# Patient Record
Sex: FEMALE | Race: BLACK OR AFRICAN AMERICAN | Employment: UNEMPLOYED | URBAN - NONMETROPOLITAN AREA
[De-identification: names, ages, dates, MRNs, and addresses within clinical notes are randomized per-mention and may not be internally consistent; named-entity substitution may affect disease eponyms.]

---

## 2020-09-14 ENCOUNTER — TELEPHONE (OUTPATIENT)
Dept: FAMILY MEDICINE CLINIC | Age: 65
End: 2020-09-14

## 2020-09-14 NOTE — TELEPHONE ENCOUNTER
Attempted to call patient, went to daughters phone took message and will have patient call to schedule appointment

## 2024-05-27 ENCOUNTER — HOSPITAL ENCOUNTER (INPATIENT)
Age: 69
LOS: 4 days | Discharge: STILL ADMITTED OR ADMINISTRATIVE | End: 2024-05-31
Attending: EMERGENCY MEDICINE
Payer: MEDICARE

## 2024-05-27 ENCOUNTER — APPOINTMENT (OUTPATIENT)
Dept: CT IMAGING | Age: 69
End: 2024-05-27
Payer: MEDICARE

## 2024-05-27 ENCOUNTER — APPOINTMENT (OUTPATIENT)
Dept: MRI IMAGING | Age: 69
End: 2024-05-27
Payer: MEDICARE

## 2024-05-27 DIAGNOSIS — R53.1 ACUTE RIGHT-SIDED WEAKNESS: Primary | ICD-10-CM

## 2024-05-27 DIAGNOSIS — I63.9 ACUTE ISCHEMIC STROKE (HCC): ICD-10-CM

## 2024-05-27 LAB
ALBUMIN SERPL-MCNC: 4.5 G/DL (ref 3.5–5.2)
ALP SERPL-CCNC: 89 U/L (ref 35–104)
ALT SERPL-CCNC: 10 U/L (ref 0–32)
ANION GAP SERPL CALCULATED.3IONS-SCNC: 16 MMOL/L (ref 7–16)
AST SERPL-CCNC: 16 U/L (ref 0–31)
BASOPHILS # BLD: 0.03 K/UL (ref 0–0.2)
BASOPHILS NFR BLD: 0 % (ref 0–2)
BILIRUB SERPL-MCNC: 0.6 MG/DL (ref 0–1.2)
BUN SERPL-MCNC: 11 MG/DL (ref 6–23)
CALCIUM SERPL-MCNC: 9.9 MG/DL (ref 8.6–10.2)
CHLORIDE SERPL-SCNC: 100 MMOL/L (ref 98–107)
CO2 SERPL-SCNC: 21 MMOL/L (ref 22–29)
CREAT SERPL-MCNC: 0.9 MG/DL (ref 0.5–1)
EOSINOPHIL # BLD: 0.04 K/UL (ref 0.05–0.5)
EOSINOPHILS RELATIVE PERCENT: 1 % (ref 0–6)
ERYTHROCYTE [DISTWIDTH] IN BLOOD BY AUTOMATED COUNT: 14.6 % (ref 11.5–15)
GFR, ESTIMATED: 67 ML/MIN/1.73M2
GLUCOSE BLD-MCNC: 107 MG/DL (ref 74–99)
GLUCOSE SERPL-MCNC: 109 MG/DL (ref 74–99)
HCT VFR BLD AUTO: 46.6 % (ref 34–48)
HGB BLD-MCNC: 15 G/DL (ref 11.5–15.5)
IMM GRANULOCYTES # BLD AUTO: 0.04 K/UL (ref 0–0.58)
IMM GRANULOCYTES NFR BLD: 1 % (ref 0–5)
INR PPP: 1.1
LYMPHOCYTES NFR BLD: 1.1 K/UL (ref 1.5–4)
LYMPHOCYTES RELATIVE PERCENT: 13 % (ref 20–42)
MAGNESIUM SERPL-MCNC: 2 MG/DL (ref 1.6–2.6)
MCH RBC QN AUTO: 29 PG (ref 26–35)
MCHC RBC AUTO-ENTMCNC: 32.2 G/DL (ref 32–34.5)
MCV RBC AUTO: 90.1 FL (ref 80–99.9)
MONOCYTES NFR BLD: 0.62 K/UL (ref 0.1–0.95)
MONOCYTES NFR BLD: 8 % (ref 2–12)
NEUTROPHILS NFR BLD: 78 % (ref 43–80)
NEUTS SEG NFR BLD: 6.37 K/UL (ref 1.8–7.3)
PLATELET # BLD AUTO: 237 K/UL (ref 130–450)
PMV BLD AUTO: 10.3 FL (ref 7–12)
POTASSIUM SERPL-SCNC: 4.1 MMOL/L (ref 3.5–5)
PROT SERPL-MCNC: 7.5 G/DL (ref 6.4–8.3)
PROTHROMBIN TIME: 11.9 SEC (ref 9.3–12.4)
RBC # BLD AUTO: 5.17 M/UL (ref 3.5–5.5)
SODIUM SERPL-SCNC: 137 MMOL/L (ref 132–146)
WBC OTHER # BLD: 8.2 K/UL (ref 4.5–11.5)

## 2024-05-27 PROCEDURE — 85610 PROTHROMBIN TIME: CPT

## 2024-05-27 PROCEDURE — 6360000004 HC RX CONTRAST MEDICATION: Performed by: RADIOLOGY

## 2024-05-27 PROCEDURE — 83735 ASSAY OF MAGNESIUM: CPT

## 2024-05-27 PROCEDURE — 99222 1ST HOSP IP/OBS MODERATE 55: CPT | Performed by: STUDENT IN AN ORGANIZED HEALTH CARE EDUCATION/TRAINING PROGRAM

## 2024-05-27 PROCEDURE — 0042T CT BRAIN PERFUSION: CPT

## 2024-05-27 PROCEDURE — 70551 MRI BRAIN STEM W/O DYE: CPT

## 2024-05-27 PROCEDURE — 82962 GLUCOSE BLOOD TEST: CPT

## 2024-05-27 PROCEDURE — 6360000002 HC RX W HCPCS: Performed by: STUDENT IN AN ORGANIZED HEALTH CARE EDUCATION/TRAINING PROGRAM

## 2024-05-27 PROCEDURE — 99285 EMERGENCY DEPT VISIT HI MDM: CPT

## 2024-05-27 PROCEDURE — 2580000003 HC RX 258: Performed by: STUDENT IN AN ORGANIZED HEALTH CARE EDUCATION/TRAINING PROGRAM

## 2024-05-27 PROCEDURE — 70498 CT ANGIOGRAPHY NECK: CPT

## 2024-05-27 PROCEDURE — 85025 COMPLETE CBC W/AUTO DIFF WBC: CPT

## 2024-05-27 PROCEDURE — 70450 CT HEAD/BRAIN W/O DYE: CPT

## 2024-05-27 PROCEDURE — 4A03X5D MEASUREMENT OF ARTERIAL FLOW, INTRACRANIAL, EXTERNAL APPROACH: ICD-10-PCS | Performed by: INTERNAL MEDICINE

## 2024-05-27 PROCEDURE — 6370000000 HC RX 637 (ALT 250 FOR IP): Performed by: STUDENT IN AN ORGANIZED HEALTH CARE EDUCATION/TRAINING PROGRAM

## 2024-05-27 PROCEDURE — 2060000000 HC ICU INTERMEDIATE R&B

## 2024-05-27 PROCEDURE — 80053 COMPREHEN METABOLIC PANEL: CPT

## 2024-05-27 PROCEDURE — 99448 NTRPROF PH1/NTRNET/EHR 21-30: CPT | Performed by: STUDENT IN AN ORGANIZED HEALTH CARE EDUCATION/TRAINING PROGRAM

## 2024-05-27 PROCEDURE — 93005 ELECTROCARDIOGRAM TRACING: CPT | Performed by: EMERGENCY MEDICINE

## 2024-05-27 PROCEDURE — 70496 CT ANGIOGRAPHY HEAD: CPT

## 2024-05-27 PROCEDURE — 6370000000 HC RX 637 (ALT 250 FOR IP): Performed by: EMERGENCY MEDICINE

## 2024-05-27 RX ORDER — LANOLIN ALCOHOL/MO/W.PET/CERES
3 CREAM (GRAM) TOPICAL NIGHTLY PRN
Status: DISCONTINUED | OUTPATIENT
Start: 2024-05-27 | End: 2024-05-31 | Stop reason: HOSPADM

## 2024-05-27 RX ORDER — CALCIUM CARBONATE 500 MG/1
500 TABLET, CHEWABLE ORAL 3 TIMES DAILY PRN
Status: DISCONTINUED | OUTPATIENT
Start: 2024-05-27 | End: 2024-05-31 | Stop reason: HOSPADM

## 2024-05-27 RX ORDER — ONDANSETRON 4 MG/1
4 TABLET, ORALLY DISINTEGRATING ORAL EVERY 8 HOURS PRN
Status: DISCONTINUED | OUTPATIENT
Start: 2024-05-27 | End: 2024-05-31 | Stop reason: HOSPADM

## 2024-05-27 RX ORDER — LABETALOL HYDROCHLORIDE 5 MG/ML
10 INJECTION, SOLUTION INTRAVENOUS EVERY 6 HOURS PRN
Status: DISCONTINUED | OUTPATIENT
Start: 2024-05-27 | End: 2024-05-31 | Stop reason: HOSPADM

## 2024-05-27 RX ORDER — CLOPIDOGREL 300 MG/1
300 TABLET, FILM COATED ORAL ONCE
Status: COMPLETED | OUTPATIENT
Start: 2024-05-27 | End: 2024-05-27

## 2024-05-27 RX ORDER — NICOTINE 21 MG/24HR
1 PATCH, TRANSDERMAL 24 HOURS TRANSDERMAL DAILY
Status: DISCONTINUED | OUTPATIENT
Start: 2024-05-27 | End: 2024-05-31 | Stop reason: HOSPADM

## 2024-05-27 RX ORDER — CYCLOBENZAPRINE HCL 10 MG
10 TABLET ORAL ONCE
Status: COMPLETED | OUTPATIENT
Start: 2024-05-27 | End: 2024-05-27

## 2024-05-27 RX ORDER — HYDRALAZINE HYDROCHLORIDE 20 MG/ML
10 INJECTION INTRAMUSCULAR; INTRAVENOUS EVERY 6 HOURS PRN
Status: DISCONTINUED | OUTPATIENT
Start: 2024-05-27 | End: 2024-05-31 | Stop reason: HOSPADM

## 2024-05-27 RX ORDER — AMLODIPINE BESYLATE 5 MG/1
5 TABLET ORAL DAILY
Status: DISCONTINUED | OUTPATIENT
Start: 2024-05-28 | End: 2024-05-28

## 2024-05-27 RX ORDER — CLOPIDOGREL BISULFATE 75 MG/1
75 TABLET ORAL DAILY
Status: DISCONTINUED | OUTPATIENT
Start: 2024-05-28 | End: 2024-05-31 | Stop reason: HOSPADM

## 2024-05-27 RX ORDER — AMLODIPINE BESYLATE 5 MG/1
5 TABLET ORAL DAILY
Status: ON HOLD | COMMUNITY
End: 2024-05-27

## 2024-05-27 RX ORDER — POLYETHYLENE GLYCOL 3350 17 G/17G
17 POWDER, FOR SOLUTION ORAL DAILY PRN
Status: DISCONTINUED | OUTPATIENT
Start: 2024-05-27 | End: 2024-05-31 | Stop reason: HOSPADM

## 2024-05-27 RX ORDER — SODIUM CHLORIDE 0.9 % (FLUSH) 0.9 %
5-40 SYRINGE (ML) INJECTION PRN
Status: DISCONTINUED | OUTPATIENT
Start: 2024-05-27 | End: 2024-05-31 | Stop reason: HOSPADM

## 2024-05-27 RX ORDER — SODIUM CHLORIDE 0.9 % (FLUSH) 0.9 %
5-40 SYRINGE (ML) INJECTION EVERY 12 HOURS SCHEDULED
Status: DISCONTINUED | OUTPATIENT
Start: 2024-05-27 | End: 2024-05-31 | Stop reason: HOSPADM

## 2024-05-27 RX ORDER — LABETALOL HYDROCHLORIDE 5 MG/ML
10 INJECTION, SOLUTION INTRAVENOUS EVERY 6 HOURS PRN
Status: DISCONTINUED | OUTPATIENT
Start: 2024-05-27 | End: 2024-05-27

## 2024-05-27 RX ORDER — TRAMADOL HYDROCHLORIDE 50 MG/1
50 TABLET ORAL ONCE
Status: COMPLETED | OUTPATIENT
Start: 2024-05-27 | End: 2024-05-27

## 2024-05-27 RX ORDER — ACETAMINOPHEN 325 MG/1
650 TABLET ORAL EVERY 4 HOURS PRN
Status: DISCONTINUED | OUTPATIENT
Start: 2024-05-27 | End: 2024-05-31 | Stop reason: HOSPADM

## 2024-05-27 RX ORDER — ONDANSETRON 2 MG/ML
4 INJECTION INTRAMUSCULAR; INTRAVENOUS EVERY 6 HOURS PRN
Status: DISCONTINUED | OUTPATIENT
Start: 2024-05-27 | End: 2024-05-31 | Stop reason: HOSPADM

## 2024-05-27 RX ORDER — SODIUM CHLORIDE 9 MG/ML
INJECTION, SOLUTION INTRAVENOUS CONTINUOUS
Status: ACTIVE | OUTPATIENT
Start: 2024-05-27 | End: 2024-05-27

## 2024-05-27 RX ORDER — ATORVASTATIN CALCIUM 40 MG/1
80 TABLET, FILM COATED ORAL NIGHTLY
Status: DISCONTINUED | OUTPATIENT
Start: 2024-05-27 | End: 2024-05-31 | Stop reason: HOSPADM

## 2024-05-27 RX ORDER — SODIUM CHLORIDE 9 MG/ML
INJECTION, SOLUTION INTRAVENOUS PRN
Status: DISCONTINUED | OUTPATIENT
Start: 2024-05-27 | End: 2024-05-31 | Stop reason: HOSPADM

## 2024-05-27 RX ORDER — ENOXAPARIN SODIUM 100 MG/ML
40 INJECTION SUBCUTANEOUS DAILY
Status: DISCONTINUED | OUTPATIENT
Start: 2024-05-27 | End: 2024-05-31 | Stop reason: HOSPADM

## 2024-05-27 RX ADMIN — CYCLOBENZAPRINE 10 MG: 10 TABLET, FILM COATED ORAL at 18:26

## 2024-05-27 RX ADMIN — POLYETHYLENE GLYCOL 3350 17 G: 17 POWDER, FOR SOLUTION ORAL at 18:26

## 2024-05-27 RX ADMIN — ATORVASTATIN CALCIUM 80 MG: 40 TABLET, FILM COATED ORAL at 20:38

## 2024-05-27 RX ADMIN — TRAMADOL HYDROCHLORIDE 50 MG: 50 TABLET ORAL at 18:26

## 2024-05-27 RX ADMIN — ENOXAPARIN SODIUM 40 MG: 100 INJECTION SUBCUTANEOUS at 17:44

## 2024-05-27 RX ADMIN — CLOPIDOGREL BISULFATE 300 MG: 300 TABLET, FILM COATED ORAL at 09:47

## 2024-05-27 RX ADMIN — SODIUM CHLORIDE: 9 INJECTION, SOLUTION INTRAVENOUS at 17:44

## 2024-05-27 RX ADMIN — IOPAMIDOL 100 ML: 755 INJECTION, SOLUTION INTRAVENOUS at 09:39

## 2024-05-27 ASSESSMENT — PAIN DESCRIPTION - LOCATION
LOCATION: BACK
LOCATION: BACK

## 2024-05-27 ASSESSMENT — PAIN DESCRIPTION - DESCRIPTORS
DESCRIPTORS: ACHING;DISCOMFORT
DESCRIPTORS: ACHING;DISCOMFORT;SORE

## 2024-05-27 ASSESSMENT — PAIN - FUNCTIONAL ASSESSMENT
PAIN_FUNCTIONAL_ASSESSMENT: NONE - DENIES PAIN
PAIN_FUNCTIONAL_ASSESSMENT: NONE - DENIES PAIN

## 2024-05-27 ASSESSMENT — PAIN SCALES - GENERAL
PAINLEVEL_OUTOF10: 4
PAINLEVEL_OUTOF10: 8

## 2024-05-27 NOTE — ED NOTES
Pt to MRI, MRI called and aware of pt to go to room 8516B after MRI, pt has all belongings and chart

## 2024-05-27 NOTE — ED PROVIDER NOTES
HPI:  5/27/24,   Time: 9:18 AM EDT       Rivka Gamboa is a 69 y.o. female presenting to the ED for rt side weakness, beginning 19 hrs ago.  The complaint has been persistent, moderate in severity, and worsened by nothing.  Thinks having a reaction to blood pressure medicine.  Right-sided arm and leg weakness.  Some sensory changes.  Brought in by EMS.  Symptoms started at 2 PM yesterday.  No headache.  No nausea vomiting diarrhea.  Did fall several times.    Review of Systems:   Pertinent positives and negatives are stated within HPI, all other systems reviewed and are negative.          --------------------------------------------- PAST HISTORY ---------------------------------------------  Past Medical History:  has a past medical history of Hypertension and Pneumonia.    Past Surgical History:  has a past surgical history that includes Hysterectomy (2003); Tonsillectomy; and Colonoscopy (3/25/14).    Social History:  reports that she has been smoking cigarettes. She has a 20.0 pack-year smoking history. She has never used smokeless tobacco. She reports current alcohol use. She reports that she does not use drugs.    Family History: family history includes Asthma in her son; Cancer in her brother; Cancer (age of onset: 40) in her sister; Coronary Art Dis in her brother and brother; Hypertension in her brother, brother, brother, brother, and brother; Kidney Disease in her brother.     The patient’s home medications have been reviewed.    Allergies: Latex, Aspirin, and Pcn [penicillins]        ---------------------------------------------------PHYSICAL EXAM--------------------------------------    Constitutional/General: Alert and oriented x3, well appearing, non toxic in NAD  Head: Normocephalic and atraumatic  Eyes: PERRL, EOMI, conjunctive normal, sclera non icteric  Mouth: Oropharynx clear, handling secretions, no trismus, no asymmetry of the posterior oropharynx or uvular edema  Neck: Supple, full ROM,  have been reviewed by myself.  BP (!) 185/112   Pulse 75   Temp 98 °F (36.7 °C)   Resp 16   Ht 1.702 m (5' 7\")   Wt 83.5 kg (184 lb)   SpO2 99%   BMI 28.82 kg/m²   Oxygen Saturation Interpretation: Normal    The patient’s available past medical records and past encounters were reviewed.        ------------------------------ ED COURSE/MEDICAL DECISION MAKING----------------------  Medications   sodium chloride flush 0.9 % injection 5-40 mL ( IntraVENous Not Given 5/27/24 1148)   sodium chloride flush 0.9 % injection 5-40 mL (has no administration in time range)   0.9 % sodium chloride infusion (has no administration in time range)   ondansetron (ZOFRAN-ODT) disintegrating tablet 4 mg (has no administration in time range)     Or   ondansetron (ZOFRAN) injection 4 mg (has no administration in time range)   polyethylene glycol (GLYCOLAX) packet 17 g (has no administration in time range)   enoxaparin (LOVENOX) injection 40 mg (has no administration in time range)   atorvastatin (LIPITOR) tablet 80 mg (has no administration in time range)   clopidogrel (PLAVIX) tablet 75 mg (has no administration in time range)   labetalol (NORMODYNE;TRANDATE) injection 10 mg (has no administration in time range)   iopamidol (ISOVUE-370) 76 % injection 100 mL (100 mLs IntraVENous Given 5/27/24 0939)   clopidogrel (PLAVIX) tablet 300 mg (300 mg Oral Given 5/27/24 0947)         ED COURSE:             This patient's ED course included: a personal history and physicial examination    This patient has remained hemodynamically stable during their ED course.      Re-Evaluations:             Re-evaluation.  Patient’s symptoms show no change    Re-examination  5/27/24   9:18 AM EDT        Consultations:             Gujrati milanes    Critical Care: 33 min excluding billable procedures        Counseling:   The emergency provider has spoken with the patient and discussed today’s results, in addition to providing specific details for the plan

## 2024-05-27 NOTE — CONSULTS
I was contacted by the emergency department to review/discuss about this MEENAKSHI alert case.  The last known well time was reported as 2 PM on 5/26/2024.  I was informed of the relevant medical history and the presenting complaints and I myself did pertinent medical chart review.  I have personally reviewed the neuroimaging wherein [employer] software was utilized to assist with triage.  No face-to-face interaction with the patient was done.    Personal review of neuroimaging shows no acute intracranial abnormality on head CT.  There is no evidence of diffusion-perfusion mismatch on perfusion imaging.  There is significant venous contamination on vascular imaging but as best studied, there is no evidence of any intracranial large vessel occlusion.  There is some atherosclerotic disease in the neck in the anterior and posterior circulation but that is not hemodynamically significant.     Assessment/Plan:    Right-sided weakness along with right facial droop and this 69-year-old woman with vascular risk factors of hypertension, dyslipidemia and smoking.  Clinical suspicion for left basal ganglia/corona radiata lacunar infarct.    Loaded with dual antiplatelet agent.  Thereafter, full dose aspirin and Plavix 75 mg daily for next 4 weeks followed by baby aspirin lifelong.  Complete the stroke workup.  Please consult general neurology service for further management of this patient.  Please call me with any additional questions/concerns.    Total time spent reviewing the pertinent clinical presentation, review of neuroimaging, time spent in medical decision making and discussion of case with the physicians involved in the acute care was 28 mins.      Sang Kumari M.D.  Vascular Neurology & Neurointerventional Surgery

## 2024-05-27 NOTE — H&P
Hospitalist History & Physical      PCP: Berry Khanna MD    Date of Admission: 5/27/2024    Date of Service: Pt seen/examined on 5/27/2024 and is admitted to Inpatient with expected LOS greater than two midnights due to medical therapy.        Chief Complaint: right side weakness    History Of Present Illness: 69-year-old female patient with history of hypertension, nicotine dependence, hyperlipidemia who presents to the ER with complaint of right-sided upper and lower extremity weakness since yesterday around 2 PM.  Patient thought it was related to the initiation of amlodipine for hypertension at first, she had difficulty walking due to right leg weakness and fell at home few times, decided to come to ER for further evaluation.  Patient also endorsed left arm tingling sensation.  Patient denies headache, LOC, syncope, visual or auditory changes, trouble swallowing, chest pain, palpitations, recent illness, fever, chills.  She continues to smoke 10 cigarettes/day, has been smoking for many years and is not considering quitting yet.  She has no prior strokes or heart disease.  She developed swelling of her tongue after taking aspirin.   Upon ER evaluation stroke alert was called.  Her vital signs she was afebrile, /95 mmHg HR 78 RR 22 SpO2 97 room air.  CT head without contrast with no acute intracranial abnormality.  CT perfusion with no mismatch.  CTA head and neck with no LVO but fibrocalcific plaque causing 50% stenosis in the cavernous ICAs bilateral and 50% stenosis in the V2 segment of the right vertebral artery.   Telestroke was contacted and patient is not a candidate for TNK of endovascular procedure.  Recommended admission for further assessment and initiation of DAPT.  Patient is allergic to aspirin.  Patient was given loading dose Plavix 300 mg on the ER  [facility] hospitalist was called for admission.  Case was discussed with ER physician.  Family was present at bedside        Past  white matter ischemic disease that is worse. 3. No evidence of flow-limiting stenosis or dissection of the cervical carotid arteries. 4. Fibrocalcific plaque results in 50% stenoses in the cavernous ICAs bilaterally. 5. 50% stenosis in the V2 segment of the right vertebral artery.  Poor visualization of the left vertebral artery secondary to refluxed contrast into paraspinal collateral veins. 6. No areas of hypoperfusion.     CTA HEAD W CONTRAST    Result Date: 5/27/2024  1. No acute intracranial abnormality. 2. Volume loss with chronic microvascular white matter ischemic disease that is worse. 3. No evidence of flow-limiting stenosis or dissection of the cervical carotid arteries. 4. Fibrocalcific plaque results in 50% stenoses in the cavernous ICAs bilaterally. 5. 50% stenosis in the V2 segment of the right vertebral artery.  Poor visualization of the left vertebral artery secondary to refluxed contrast into paraspinal collateral veins. 6. No areas of hypoperfusion.     CTA NECK W CONTRAST    Result Date: 5/27/2024  1. No acute intracranial abnormality. 2. Volume loss with chronic microvascular white matter ischemic disease that is worse. 3. No evidence of flow-limiting stenosis or dissection of the cervical carotid arteries. 4. Fibrocalcific plaque results in 50% stenoses in the cavernous ICAs bilaterally. 5. 50% stenosis in the V2 segment of the right vertebral artery.  Poor visualization of the left vertebral artery secondary to refluxed contrast into paraspinal collateral veins. 6. No areas of hypoperfusion.     CT BRAIN PERFUSION    Result Date: 5/27/2024  1. No acute intracranial abnormality. 2. Volume loss with chronic microvascular white matter ischemic disease that is worse. 3. No evidence of flow-limiting stenosis or dissection of the cervical carotid arteries. 4. Fibrocalcific plaque results in 50% stenoses in the cavernous ICAs bilaterally. 5. 50% stenosis in the V2 segment of the right vertebral

## 2024-05-27 NOTE — PROGRESS NOTES
4 Eyes Skin Assessment     NAME:  Rivka Gamboa  YOB: 1955  MEDICAL RECORD NUMBER:  [id number]    The patient is being assessed for  Admission    I agree that at least one RN has performed a thorough Head to Toe Skin Assessment on the patient. ALL assessment sites listed below have been assessed.      Areas assessed by both nurses:    Head, Face, Ears, Shoulders, Back, Chest, Arms, Elbows, Hands, Sacrum. Buttock, Coccyx, Ischium, Legs. Feet and Heels, and Under Medical Devices         Does the Patient have a Wound? No noted wound(s)       Shivam Prevention initiated by RN: No  Wound Care Orders initiated by RN: No    Pressure Injury (Stage 3,4, Unstageable, DTI, NWPT, and Complex wounds) if present, place Wound referral order by RN under : No    New Ostomies, if present place, Ostomy referral order under : No     Nurse 1 eSignature: Electronically signed by Khloe Callejas RN on 5/27/24 at 6:33 PM EDT    **SHARE this note so that the co-signing nurse can place an eSignature**    Nurse 2 eSignature: Electronically signed by Liliya Harris RN on 5/27/24 at 6:35 PM EDT

## 2024-05-28 ENCOUNTER — APPOINTMENT (OUTPATIENT)
Age: 69
End: 2024-05-28
Attending: STUDENT IN AN ORGANIZED HEALTH CARE EDUCATION/TRAINING PROGRAM
Payer: MEDICARE

## 2024-05-28 PROBLEM — I63.522 ACUTE LEFT ACA ISCHEMIC STROKE (HCC): Status: ACTIVE | Noted: 2024-05-27

## 2024-05-28 LAB
CHOLEST SERPL-MCNC: 188 MG/DL
ECHO AO ASC DIAM: 2.4 CM
ECHO AO ASCENDING AORTA INDEX: 1.23 CM/M2
ECHO AV AREA PEAK VELOCITY: 1.8 CM2
ECHO AV AREA VTI: 1.8 CM2
ECHO AV AREA/BSA PEAK VELOCITY: 0.9 CM2/M2
ECHO AV AREA/BSA VTI: 0.9 CM2/M2
ECHO AV CUSP MM: 1.6 CM
ECHO AV MEAN GRADIENT: 4 MMHG
ECHO AV MEAN VELOCITY: 0.8 M/S
ECHO AV PEAK GRADIENT: 9 MMHG
ECHO AV PEAK VELOCITY: 1.5 M/S
ECHO AV VELOCITY RATIO: 0.67
ECHO AV VTI: 28.4 CM
ECHO LA DIAMETER INDEX: 1.54 CM/M2
ECHO LA DIAMETER: 3 CM
ECHO LA VOL A-L A2C: 26 ML (ref 22–52)
ECHO LA VOL A-L A4C: 22 ML (ref 22–52)
ECHO LA VOL MOD A2C: 24 ML (ref 22–52)
ECHO LA VOL MOD A4C: 20 ML (ref 22–52)
ECHO LA VOLUME AREA LENGTH: 25 ML
ECHO LA VOLUME INDEX A-L A2C: 13 ML/M2 (ref 16–34)
ECHO LA VOLUME INDEX A-L A4C: 11 ML/M2 (ref 16–34)
ECHO LA VOLUME INDEX AREA LENGTH: 13 ML/M2 (ref 16–34)
ECHO LA VOLUME INDEX MOD A2C: 12 ML/M2 (ref 16–34)
ECHO LA VOLUME INDEX MOD A4C: 10 ML/M2 (ref 16–34)
ECHO LV FRACTIONAL SHORTENING: 30 % (ref 28–44)
ECHO LV INTERNAL DIMENSION DIASTOLE INDEX: 2.26 CM/M2
ECHO LV INTERNAL DIMENSION DIASTOLIC: 4.4 CM (ref 3.9–5.3)
ECHO LV INTERNAL DIMENSION SYSTOLIC INDEX: 1.59 CM/M2
ECHO LV INTERNAL DIMENSION SYSTOLIC: 3.1 CM
ECHO LV ISOVOLUMETRIC RELAXATION TIME (IVRT): 92.3 MS
ECHO LV IVSD: 0.9 CM (ref 0.6–0.9)
ECHO LV IVSS: 1.1 CM
ECHO LV MASS 2D: 158.2 G (ref 67–162)
ECHO LV MASS INDEX 2D: 81.1 G/M2 (ref 43–95)
ECHO LV POSTERIOR WALL DIASTOLIC: 1.2 CM (ref 0.6–0.9)
ECHO LV POSTERIOR WALL SYSTOLIC: 2 CM
ECHO LV RELATIVE WALL THICKNESS RATIO: 0.55
ECHO LVOT AREA: 2.8 CM2
ECHO LVOT AV VTI INDEX: 0.65
ECHO LVOT DIAM: 1.9 CM
ECHO LVOT MEAN GRADIENT: 2 MMHG
ECHO LVOT PEAK GRADIENT: 4 MMHG
ECHO LVOT PEAK VELOCITY: 1 M/S
ECHO LVOT STROKE VOLUME INDEX: 27 ML/M2
ECHO LVOT SV: 52.7 ML
ECHO LVOT VTI: 18.6 CM
ECHO MV A VELOCITY: 1.06 M/S
ECHO MV AREA PHT: 3.6 CM2
ECHO MV AREA VTI: 2 CM2
ECHO MV E DECELERATION TIME (DT): 138.6 MS
ECHO MV E VELOCITY: 0.65 M/S
ECHO MV E/A RATIO: 0.61
ECHO MV LVOT VTI INDEX: 1.41
ECHO MV MAX VELOCITY: 1.1 M/S
ECHO MV MEAN GRADIENT: 1 MMHG
ECHO MV MEAN VELOCITY: 0.5 M/S
ECHO MV PEAK GRADIENT: 5 MMHG
ECHO MV PRESSURE HALF TIME (PHT): 61.7 MS
ECHO MV VTI: 26.3 CM
ECHO PV MAX VELOCITY: 1 M/S
ECHO PV MEAN GRADIENT: 2 MMHG
ECHO PV MEAN VELOCITY: 0.7 M/S
ECHO PV PEAK GRADIENT: 4 MMHG
ECHO PV VTI: 19.9 CM
ECHO PVEIN A DURATION: 115.3 MS
ECHO PVEIN A VELOCITY: 0.3 M/S
ECHO PVEIN PEAK D VELOCITY: 0.5 M/S
ECHO PVEIN PEAK S VELOCITY: 0.6 M/S
ECHO PVEIN S/D RATIO: 1.2
ECHO RV INTERNAL DIMENSION: 2.6 CM
ECHO RVOT MEAN GRADIENT: 2 MMHG
ECHO RVOT PEAK GRADIENT: 3 MMHG
ECHO RVOT PEAK VELOCITY: 0.9 M/S
ECHO RVOT VTI: 19.3 CM
EKG ATRIAL RATE: 69 BPM
EKG P AXIS: 53 DEGREES
EKG P-R INTERVAL: 178 MS
EKG Q-T INTERVAL: 412 MS
EKG QRS DURATION: 78 MS
EKG QTC CALCULATION (BAZETT): 441 MS
EKG R AXIS: 82 DEGREES
EKG T AXIS: 30 DEGREES
EKG VENTRICULAR RATE: 69 BPM
ERYTHROCYTE [DISTWIDTH] IN BLOOD BY AUTOMATED COUNT: 14.9 % (ref 11.5–15)
HBA1C MFR BLD: 6 % (ref 4–5.6)
HCT VFR BLD AUTO: 44.9 % (ref 34–48)
HDLC SERPL-MCNC: 51 MG/DL
HGB BLD-MCNC: 14.5 G/DL (ref 11.5–15.5)
LDLC SERPL CALC-MCNC: 119 MG/DL
MCH RBC QN AUTO: 29.7 PG (ref 26–35)
MCHC RBC AUTO-ENTMCNC: 32.3 G/DL (ref 32–34.5)
MCV RBC AUTO: 91.8 FL (ref 80–99.9)
PLATELET # BLD AUTO: 192 K/UL (ref 130–450)
PMV BLD AUTO: 10.6 FL (ref 7–12)
RBC # BLD AUTO: 4.89 M/UL (ref 3.5–5.5)
T4 FREE SERPL-MCNC: 1.2 NG/DL (ref 0.9–1.7)
TRIGL SERPL-MCNC: 91 MG/DL
TSH SERPL DL<=0.05 MIU/L-ACNC: 0.76 UIU/ML (ref 0.27–4.2)
VLDLC SERPL CALC-MCNC: 18 MG/DL
WBC OTHER # BLD: 6.7 K/UL (ref 4.5–11.5)

## 2024-05-28 PROCEDURE — 84443 ASSAY THYROID STIM HORMONE: CPT

## 2024-05-28 PROCEDURE — 2580000003 HC RX 258: Performed by: STUDENT IN AN ORGANIZED HEALTH CARE EDUCATION/TRAINING PROGRAM

## 2024-05-28 PROCEDURE — 99222 1ST HOSP IP/OBS MODERATE 55: CPT | Performed by: PHYSICIAN ASSISTANT

## 2024-05-28 PROCEDURE — 83036 HEMOGLOBIN GLYCOSYLATED A1C: CPT

## 2024-05-28 PROCEDURE — 99222 1ST HOSP IP/OBS MODERATE 55: CPT | Performed by: PSYCHIATRY & NEUROLOGY

## 2024-05-28 PROCEDURE — 93306 TTE W/DOPPLER COMPLETE: CPT | Performed by: INTERNAL MEDICINE

## 2024-05-28 PROCEDURE — 2060000000 HC ICU INTERMEDIATE R&B

## 2024-05-28 PROCEDURE — 36415 COLL VENOUS BLD VENIPUNCTURE: CPT

## 2024-05-28 PROCEDURE — 92523 SPEECH SOUND LANG COMPREHEN: CPT | Performed by: SPEECH-LANGUAGE PATHOLOGIST

## 2024-05-28 PROCEDURE — 99232 SBSQ HOSP IP/OBS MODERATE 35: CPT | Performed by: INTERNAL MEDICINE

## 2024-05-28 PROCEDURE — 93010 ELECTROCARDIOGRAM REPORT: CPT | Performed by: INTERNAL MEDICINE

## 2024-05-28 PROCEDURE — 97535 SELF CARE MNGMENT TRAINING: CPT

## 2024-05-28 PROCEDURE — 93306 TTE W/DOPPLER COMPLETE: CPT

## 2024-05-28 PROCEDURE — 6360000002 HC RX W HCPCS: Performed by: STUDENT IN AN ORGANIZED HEALTH CARE EDUCATION/TRAINING PROGRAM

## 2024-05-28 PROCEDURE — 97166 OT EVAL MOD COMPLEX 45 MIN: CPT

## 2024-05-28 PROCEDURE — 84439 ASSAY OF FREE THYROXINE: CPT

## 2024-05-28 PROCEDURE — 6370000000 HC RX 637 (ALT 250 FOR IP): Performed by: STUDENT IN AN ORGANIZED HEALTH CARE EDUCATION/TRAINING PROGRAM

## 2024-05-28 PROCEDURE — 80061 LIPID PANEL: CPT

## 2024-05-28 PROCEDURE — 85027 COMPLETE CBC AUTOMATED: CPT

## 2024-05-28 PROCEDURE — 92526 ORAL FUNCTION THERAPY: CPT | Performed by: SPEECH-LANGUAGE PATHOLOGIST

## 2024-05-28 PROCEDURE — 92610 EVALUATE SWALLOWING FUNCTION: CPT | Performed by: SPEECH-LANGUAGE PATHOLOGIST

## 2024-05-28 PROCEDURE — 6370000000 HC RX 637 (ALT 250 FOR IP): Performed by: INTERNAL MEDICINE

## 2024-05-28 RX ORDER — IBUPROFEN 400 MG/1
600 TABLET ORAL EVERY 6 HOURS PRN
Status: DISCONTINUED | OUTPATIENT
Start: 2024-05-28 | End: 2024-05-31 | Stop reason: HOSPADM

## 2024-05-28 RX ORDER — AMLODIPINE BESYLATE 10 MG/1
10 TABLET ORAL DAILY
Status: DISCONTINUED | OUTPATIENT
Start: 2024-05-28 | End: 2024-05-31 | Stop reason: HOSPADM

## 2024-05-28 RX ADMIN — SODIUM CHLORIDE, PRESERVATIVE FREE 10 ML: 5 INJECTION INTRAVENOUS at 20:13

## 2024-05-28 RX ADMIN — IBUPROFEN 600 MG: 400 TABLET, FILM COATED ORAL at 19:09

## 2024-05-28 RX ADMIN — CLOPIDOGREL BISULFATE 75 MG: 75 TABLET ORAL at 10:41

## 2024-05-28 RX ADMIN — ATORVASTATIN CALCIUM 80 MG: 40 TABLET, FILM COATED ORAL at 20:03

## 2024-05-28 RX ADMIN — ENOXAPARIN SODIUM 40 MG: 100 INJECTION SUBCUTANEOUS at 10:47

## 2024-05-28 RX ADMIN — POLYETHYLENE GLYCOL 3350 17 G: 17 POWDER, FOR SOLUTION ORAL at 10:41

## 2024-05-28 RX ADMIN — AMLODIPINE BESYLATE 10 MG: 10 TABLET ORAL at 10:41

## 2024-05-28 RX ADMIN — SODIUM CHLORIDE, PRESERVATIVE FREE 10 ML: 5 INJECTION INTRAVENOUS at 09:30

## 2024-05-28 ASSESSMENT — PAIN DESCRIPTION - ORIENTATION: ORIENTATION: RIGHT

## 2024-05-28 ASSESSMENT — PAIN DESCRIPTION - DESCRIPTORS: DESCRIPTORS: DULL

## 2024-05-28 ASSESSMENT — PAIN DESCRIPTION - LOCATION: LOCATION: LEG

## 2024-05-28 ASSESSMENT — PAIN SCALES - GENERAL: PAINLEVEL_OUTOF10: 6

## 2024-05-28 NOTE — PROGRESS NOTES
OCCUPATIONAL THERAPY INITIAL EVALUATION    [facility] [address], [place]       Date:2024                                                  Patient Name: Rivka Gamboa  MRN: [id number]  : 1955  Room: [address] [place] [address]    Evaluating OT: Reggie Pink OTR/L [id number]    Referring Provider: Milanes Marino, Maria, MD      Specific Provider Orders/Date: OT evaluation and treatment 24 1145    Diagnosis:  Acute ischemic stroke (HCC) [I63.9]  Acute right-sided weakness [R53.1]      Pertinent Medical History:  has a past medical history of Hypertension and Pneumonia.   Past Surgical History:   Procedure Laterality Date    COLONOSCOPY  3/25/14    HYSTERECTOMY (CERVIX STATUS UNKNOWN)      [facility]     TONSILLECTOMY         Pt admitted to the hospital  with R sided weakness     Orders received, chart reviewed, eval complete.     Precautions:  Fall Risk, R sided weakness, cognition, +alarms     Assessment of current deficits   [x] Functional mobility   [x]ADLs  [x] Strength               [x]Cognition   [x] Functional transfers   [x] IADLs         [x] Safety Awareness   [x]Endurance   [x] Fine Motor Coordination [x] Balance [] Vision/perception   [x]Sensation    [x] Gross Motor Coordination [x] ROM  [] Delirium                  [] Motor Control     OT PLAN OF CARE   OT POC based on physician orders, patient diagnosis and results of clinical assessment    Frequency/Duration 2-4 days/wk for 2 weeks PRN   Specific OT Treatment to include:   * Instruction/training on adapted ADL techniques and AE recommendations to increase functional independence within precautions       * Training on energy conservation strategies, correct breathing pattern and techniques to improve independence/tolerance for self-care routine  * Functional transfer/mobility training/DME recommendations for increased independence, safety, and fall prevention  *  demonstrated ability to locate and press call button.     Rehab Potential: Good  for established goals     Patient / Family Goal: to get better     Patient and/or family were instructed on functional diagnosis, prognosis/goals and OT plan of care. Pt/family demonstrated understanding.      Eval Complexity:      Description  Performance deficits  Clinical decision making  Co-morbidities affecting occupational performance  Modification or assistance to complete eval    Low Complexity   1 to 3 []  Low []  None []  None []   Moderate Complexity   3 to 5 [x]  Mod []  Maybe []  Min to Mod [x]   High Complexity   5 or more []  High [x]  Yes [x]  Max []     The above evaluation is classified as moderate  complexity based off the noted performance deficits, personal factors, co-morbidities, assistance required, and other factors as noted in the clinical evaluation and functional testing.     Time In: 1435  Time Out: 1458  Total Treatment Time: 8 minutes    Min Units   OT Eval Low 04981       OT Eval Moderate 97166  x 1   OT Eval High 76833       OT Re-Eval 09404       Therapeutic Ex 61101       Therapeutic Activities 45635      ADL/Self Care 52545  8 1   Orthotic Management 51232       Neuro Re-Ed 62187       Non-Billable Time          Evaluation Time includes thorough review of current medical information, gathering information on past medical history/social history and prior level of function, completion of standardized testing/informal observation of tasks, assessment of data and education on plan of care and goals.          Reggie Pink OTR/L [id number]

## 2024-05-28 NOTE — PROGRESS NOTES
swallowing  RN cleared patient for participation in assessment     yes     PRIOR LEVEL OF SWALLOW FUNCTION:    PAST HISTORY OF OROPHARYNGEAL DYSPHAGIA?: none reported    Home diet: Regular consistency solids (IDDSI level 7) with  thin liquids (IDDSI level 0)  Current Diet Order:  ADULT DIET; Regular; Low Sodium (2 gm)    PROCEDURE:  Consistencies Administered During the Evaluation   Liquids: thin liquid   Solids:  Hard solid ([employer] sandwich)     Method of Intake:   straw  Self fed      Position:   Lying in bed with head elevated below 75 degrees due to patient discomfort    CLINICAL ASSESSMENT:  Oral Stage:       prolonged mastication resulting in poor oral manipulation, poor bolus formation and increased oral residue post swallow  Piecemeal deglutition  Oral residuals were noted :  right buccal cavity         Pharyngeal Stage:    No signs of aspiration were noted during this evaluation however, silent aspiration cannot be ruled out at bedside.  If silent aspiration is suspected, a Videofluoroscopic Study of Swallowing (MBS) is recommended and requires a physician order.    Cognition:   Within functional limits for this exam    Oral Peripheral Examination   Right labiobuccal weakness    Current Respiratory Status    room air     Parameters of Speech Production  Respiration:  Adequate for speech production  Quality:   Within functional limits  Intensity: Within functional limits    Volitional Swallow: Present     Volitional Cough:  Present     Pain: No pain reported.    EDUCATION:   The Speech Language Pathologist (SLP) completed education regarding results of evaluation and that intervention is warranted at this time.  Learner: Patient and Family  Education: Reviewed results and recommendations of this evaluation, Reviewed diet and strategies, Reviewed signs, symptoms and risks of aspiration, Reviewed recommendations for follow-up, and Education Related to Potential Risks and Complications Due to  Impairment/Illness/Injury  Evaluation of Education:  Verbalizes understanding    This plan may be re-evaluated and revised as warranted.      Evaluation Time includes thorough review of current medical information, gathering information on past medical history/social history and prior level of function, completion of standardized testing/informal observation of tasks, assessment of data and education on plan of care and goals.    [x]The admitting diagnosis and active problem list, have been reviewed prior to initiation of this evaluation.        ACTIVE PROBLEM LIST:   Patient Active Problem List   Diagnosis    HTN (hypertension)    Acute ischemic stroke (HCC)    Acute right-sided weakness         CPT code:  85062  bedside swallow eval    INTERVENTION  CPT Code: 03898  dysphagia tx    Speech Pathologist (SLP) completed education with the patient/family regarding type of swallowing impairment. Reviewed current solid/liquid consistency diet recommendations and discussed compensatory strategies to ensure safe PO intake. Reviewed aspiration precautions and SLP's rationale for diet modifications. SLP also stressed that Pt should discuss recommendations with her physician. Cues provided to encourage clearance of bolus.  Encouraged patient and/or family to engage SLP in unstructured Q&A session relative to identified deficit areas; indicated understanding of all information provided via satisfactory verbal response.    Arabella Simmons M.S., CCC-SLP  Speech-Language Pathologist  [id number]  5/28/2024

## 2024-05-28 NOTE — PLAN OF CARE
Problem: Chronic Conditions and Co-morbidities  Goal: Patient's chronic conditions and co-morbidity symptoms are monitored and maintained or improved  5/28/2024 0522 by Ruby Patel RN  Outcome: Progressing  5/27/2024 1944 by Khloe Callejas RN  Outcome: Progressing     Problem: Discharge Planning  Goal: Discharge to home or other facility with appropriate resources  5/28/2024 0522 by Ruby Patel RN  Outcome: Progressing  5/27/2024 1944 by Khloe Callejas RN  Outcome: Progressing     Problem: Pain  Goal: Verbalizes/displays adequate comfort level or baseline comfort level  5/28/2024 0522 by Ruby Patel RN  Outcome: Progressing  5/27/2024 1944 by Khloe Callejas RN  Outcome: Progressing     Problem: Safety - Adult  Goal: Free from fall injury  5/28/2024 0522 by Ruby Patel RN  Outcome: Progressing  5/27/2024 1944 by Khloe Callejas RN  Outcome: Progressing

## 2024-05-28 NOTE — CARE COORDINATION
Chart reviewed and case reviewed in IDR.  Patient admitted with right sided weakness and droop.  MRI showing Acute infarct in the left posterior limb internal capsule. Echocardiogram compelted today, await reading.  Met with the patient at the bedside to discuss transition of care planning.  Patient working with OT.  Recommendation is for ARU.  Patient stated she was independent PTA living in a second story apartment with six steps to enter.  Patient has no DME, no history of HHC or rehab and does not drive.  Her kids take her where she needs to go.  Patient currently is going to [facility] for PCP but would like a new PCP appointment made at discharge for a [facility] Physician.  Patient gets her medications at [facility] on [address].  Discussed referral for rehab prior to returning home and patient is agreeable to ARU prior to returning home and would like to stay at [facility].  PM&R consult obtained and called to [id number].  Detailed VM left with referral information.  Await return call with physician to review case.  Will continue to follow for further transition of care planning needs.       Francie Cuellar RN.  P:  [phone]      Case Management Assessment  Initial Evaluation    Date/Time of Evaluation: 5/28/2024 3:09 PM  Assessment Completed by: Francie Cuellar RN    If patient is discharged prior to next notation, then this note serves as note for discharge by case management.    Patient Name: Rivka Gamboa                   YOB: 1955  Diagnosis: Acute ischemic stroke (HCC) [I63.9]  Acute right-sided weakness [R53.1]                   Date / Time: 5/27/2024  9:12 AM    Patient Admission Status: Inpatient   Readmission Risk (Low < 19, Mod (19-27), High > 27): Readmission Risk Score: 8.5    Current PCP: Berry Khanna MD  PCP verified by CM? Yes ([facility])    Chart Reviewed: Yes      History Provided by: Patient  Patient Orientation: Alert and Oriented    Patient Cognition:

## 2024-05-28 NOTE — PROGRESS NOTES
[facility] Hospitalist Progress Note    Admitting Date and Time: 5/27/2024  9:12 AM  Admit Dx: Acute ischemic stroke (HCC) [I63.9]  Acute right-sided weakness [R53.1]    Synopsis: 69-year-old female patient with history of hypertension, nicotine dependence, hyperlipidemia who presents to the ER with complaint of right-sided upper and lower extremity weakness since 5/26 no history of prior strokes or heart disease.  Patient with elevated blood pressure upon arrival. CT head without contrast with no acute intracranial abnormality. CT perfusion with no mismatch, CTA head and neck with no LVO but fibrocalcific plaque causing 50% stenosis in the cavernous ICAs bilateral and 50% stenosis in the V2 segment of the right vertebral artery. Telestroke was contacted and patient is not a candidate for TNK of endovascular procedure. Recommended admission for further assessment and initiation of DAPT. Patient is allergic to aspirin. Patient was given loading dose Plavix 300 mg on the ER     Subjective:  Patient seen and examined at bedside  Continues to have some issues with gripping objects such as her drink but otherwise strength is improving on the right side.    ROS: denies fever, chills, cp, sob, n/v, HA unless stated above.      amLODIPine  10 mg Oral Daily    sodium chloride flush  5-40 mL IntraVENous 2 times per day    enoxaparin  40 mg SubCUTAneous Daily    atorvastatin  80 mg Oral Nightly    clopidogrel  75 mg Oral Daily    nicotine  1 patch TransDERmal Daily     sodium chloride flush, 5-40 mL, PRN  sodium chloride, , PRN  ondansetron, 4 mg, Q8H PRN   Or  ondansetron, 4 mg, Q6H PRN  polyethylene glycol, 17 g, Daily PRN  labetalol, 10 mg, Q6H PRN  hydrALAZINE, 10 mg, Q6H PRN  melatonin, 3 mg, Nightly PRN  calcium carbonate, 500 mg, TID PRN  acetaminophen, 650 mg, Q4H PRN         Objective:    BP (!) 166/75   Pulse 65   Temp 98 °F (36.7 °C) (Oral)   Resp 18   Ht 1.702 m (5' 7\")   Wt 83.5 kg (184 lb)   SpO2  disease that   is worse.   3. No evidence of flow-limiting stenosis or dissection of the cervical   carotid arteries.   4. Fibrocalcific plaque results in 50% stenoses in the cavernous ICAs   bilaterally.   5. 50% stenosis in the V2 segment of the right vertebral artery.  Poor   visualization of the left vertebral artery secondary to refluxed contrast   into paraspinal collateral veins.   6. No areas of hypoperfusion.         CTA NECK W CONTRAST   Final Result   1. No acute intracranial abnormality.   2. Volume loss with chronic microvascular white matter ischemic disease that   is worse.   3. No evidence of flow-limiting stenosis or dissection of the cervical   carotid arteries.   4. Fibrocalcific plaque results in 50% stenoses in the cavernous ICAs   bilaterally.   5. 50% stenosis in the V2 segment of the right vertebral artery.  Poor   visualization of the left vertebral artery secondary to refluxed contrast   into paraspinal collateral veins.   6. No areas of hypoperfusion.         CT BRAIN PERFUSION   Final Result   1. No acute intracranial abnormality.   2. Volume loss with chronic microvascular white matter ischemic disease that   is worse.   3. No evidence of flow-limiting stenosis or dissection of the cervical   carotid arteries.   4. Fibrocalcific plaque results in 50% stenoses in the cavernous ICAs   bilaterally.   5. 50% stenosis in the V2 segment of the right vertebral artery.  Poor   visualization of the left vertebral artery secondary to refluxed contrast   into paraspinal collateral veins.   6. No areas of hypoperfusion.              Assessment:    Principal Problem:    Acute ischemic stroke (HCC)  Active Problems:    Acute right-sided weakness  Resolved Problems:    * No resolved hospital problems. *      Plan:  Acute ischemic stroke.  Late presentation.  Not a candidate for TNK .  No LVO, not a candidate for endovascular procedure.   MRI brain, TTE with bubble study for cardiac sources, continue

## 2024-05-28 NOTE — ACP (ADVANCE CARE PLANNING)
Advance Care Planning   Healthcare Decision Maker:    Primary Decision Maker: Su Gamboa - Child - [phone]    Primary Decision Maker: Lanny Palacios - Child - [phone]    Click here to complete Healthcare Decision Makers including selection of the Healthcare Decision Maker Relationship (ie \"Primary\").                 Francie Cuellar RN.

## 2024-05-28 NOTE — CONSULTS
[facility]  Neuro Inpatient Follow Up        Rivka Gamboa is a 69 y.o. right handed female     Neuro is following for ischemic stroke    Significant PMH: HTN, HLD, and nicotine dependence    HPI:    Rivka Gamboa is a 68 yo female who presented to ED for evaluation of right-sided weakness and dysarthria that began 19 hours prior to arrival in the ER.  She reports to abnormal sensation to the entirety of her right side, heaviness.  Upon arrival to ED NIH SS was 5, blood pressure was 197/95.  The patient denies any improvement of symptoms since admission.  She denies headache, slurred speech, difficulty swallowing, vision change, dizziness or lightheadedness.  The patient was not a candidate for TNK or endovascular intervention.    The patient denies previous history of stroke, she does not take an 81mg aspirin at home.     Subjective:  Patient is resting in bed, no new worries or concerns.  No improvement of symptoms since admission.  No family at bedside.     No chest pain or palpitations  No SOB  No vertigo, lightheadedness or loss of consciousness  No falls, tripping or stumbling  No incontinence of bowels or bladder  No itching or bruising appreciated  No swallowing troubles  + Right-sided arm and leg weakness, numbness, dysarthria  ROS otherwise negative     Objective:     BP (!) 166/75   Pulse 65   Temp 98 °F (36.7 °C) (Oral)   Resp 18   Ht 1.702 m (5' 7\")   Wt 83.5 kg (184 lb)   SpO2 93%   BMI 28.82 kg/m²     General appearance: alert, appears stated age, cooperative and no distress  Head: normocephalic, without obvious abnormality, atraumatic  Eyes: conjunctivae/corneas clear. .  Neck: full ROM without cervicalgia  Lungs: clear to auscultation bilaterally  Skin: color, texture, turgor normal---no rashes or lesions      Mental Status: Alert, oriented, thought content appropriate     Appropriate attention/concentration  Intact fundus of knowledge  Intact  memories    Speech: no dysarthria  Language: no aphasias    Cranial Nerves:  I: smell NA   II: visual acuity  NA   II: visual fields Full to confrontation   II: pupils ALEX   III,VII: ptosis None   III,IV,VI: extraocular muscles  Full ROM   V: mastication Normal   V: facial light touch sensation  Normal   V,VII: corneal reflex     VII: facial muscle function - upper  Normal   VII: facial muscle function - lower Right facial droop   VIII: hearing Normal   IX: soft palate elevation  Normal   IX,X: gag reflex    XI: trapezius strength  5/5   XI: sternocleidomastoid strength 5/5   XI: neck extension strength  5/5   XII: tongue strength  Right sided weakness     Motor:  Right   4/5              Left   5/5               Right Bicep  4/5           Left Bicep  5/5              Right Triceps   4/5       Left Triceps  5/5          Right Deltoid  5/5     Left Deltoid  5/5                  Right Quadriceps  5/5          Left Quadriceps    5/5           Right Gastrocnemius    5/5    Left Gastrocnemius   5/5  Right Ant Tibialis   5/5  Left Ant Tibialis   5/5    Sensory:  LT, PP and vibration intact      Coordination:   cerebellar arm drift present on the right  finger to nose abnormal on the right, normal heel to shin b/l  FELIPE    Gait:   Not tested safety concern    DTR:   Right Brachioradialis reflex 3+  Left Brachioradialis reflex 2+  Right Biceps reflex 3+  Left Biceps reflex 2+  Right Triceps reflex 3+  Left Triceps reflex 2+  Right Quadriceps reflex 2+  Left Quadriceps reflex 2+  Right Achilles reflex 1+  Left Achilles reflex 1+    No Babinskis  No Sauceda's    No pathological reflexes    Laboratory/Radiology:     CBC with Differential:    Lab Results   Component Value Date/Time    WBC 6.7 05/28/2024 05:56 AM    RBC 4.89 05/28/2024 05:56 AM    HGB 14.5 05/28/2024 05:56 AM    HCT 44.9 05/28/2024 05:56 AM     05/28/2024 05:56 AM    MCV 91.8 05/28/2024 05:56 AM    MCH 29.7 05/28/2024 05:56 AM    MCHC 32.3

## 2024-05-28 NOTE — PROGRESS NOTES
OT SESSION ATTEMPT     Date:2024  Patient Name: Rivka Gamboa  MRN: [id number]  : 1955  Room: [address] [place] [address]     Occupational therapy orders received/chart review completed and OT session attempted this date:   Pt getting ECHO at bedside at 1009      Will reattempt OT at a later time/date.  Thank you,   Reggie Pink OTR/L [id number]

## 2024-05-28 NOTE — PROGRESS NOTES
Date: 2024       Patient Name: Rivka Gamboa  : 1955      MRN: [id number]    PT order received. Chart has been reviewed. PT evaluation will be on hold due to patient getting ECHO bedside. Will continue to follow and complete evaluation at later time.     Yefri Malave, PT

## 2024-05-28 NOTE — PROGRESS NOTES
SPEECH/LANGUAGE PATHOLOGY  SPEECH/LANGUAGE/COGNITIVE EVALUATION   and PLAN OF CARE      PATIENT NAME:  Rivka Gamboa  (female)     MRN:  [id number]    :  1955  (69 y.o.)  STATUS:  Inpatient: Room 8516/8516-B    TODAY'S DATE:  24 1145    Speech Language Pathology (SLP) eval and treat  Start:  24 1145,   End:  24 1145,   ONE TIME,   Standing Count:  1 Occurrences,   R       Milanes Marino, Maria, MD  REASON FOR REFERRAL:  stroke-like symptoms   EVALUATING THERAPIST: LIZBET Thompson    ADMITTING DIAGNOSIS: Acute ischemic stroke (HCC) [I63.9]  Acute right-sided weakness [R53.1]    VISIT DIAGNOSIS:        SPEECH THERAPY  PLAN OF CARE   The speech therapy  POC is established based on physician order, speech pathology diagnosis and results of clinical assessment     SPEECH PATHOLOGY DIAGNOSIS:    Mild Cognitive Deficits, Minimal to Mild Dysarthria      Speech Pathology intervention is recommended up to 6 times per week for LOS or when goals are met with emphasis on the following:      Conditions Requiring Skilled Therapeutic Intervention for speech, language and/or cognition    Cognitive linguistic impairment    Specific Speech Therapy Interventions to Include:   Expressive language training   Therapeutic tasks for Cognition    Specific instructions for next treatment:     To initiate POC    SHORT/LONG TERM GOALS  Pt will improve problem solving/thought organization during structured and unstructured tasks with 90% accuracy   Pt will improve speech intelligibility and increased articulatory precision via compensatory strategies and oral motor exercises     Patient goals: Patient/family involved in developing goals and treatment plan:   Treatment goals discussed with Patient and Family    The Patient and Family understand(s) the diagnosis, prognosis and plan of care   The patient/family Agreed with above,     This plan may be re-evaluated and revised as warranted.         Rehabilitation Potential/Prognosis: good                CLINICAL ASSESSMENT:  MOTOR SPEECH       Oral Peripheral Examination   Right labiobuccal weakness    Parameters of Speech Production  Respiration:  Adequate for speech production  Articulation:  Minimal Distortion  Resonance:  Within functional limits  Quality:   Within functional limits  Pitch:    Within functional limits  Intensity: Within functional limits  Fluency:  Intact  Prosody Intact    Speech Intelligibility      Good given unstructured task and unknown context             RECEPTIVE LANGUAGE    Comprehension of Yes/No Questions:   Within functional limits    Process  Simple Verbal Commands:   Within functional limits  Process Intermediate Verbal Commands:   Could not test  Process Complex Verbal Commands:     Could not test    Comprehension of Conversation:      Within functional limits      EXPRESSIVE LANGUAGE     Serials: Functional    Imitation:  Words   Functional   Sentences To be assessed    Naming:  (Modality used:  Verbal)  Confrontation Naming  Functional  Functional Description  Functional  Response Naming: Functional    Conversation:      Decreased thought organization during structured conversation    COGNITION     Attention/Orientation  Attention: Sustained attention   Orientation:  Oriented to Person, Place, Date, Reason for hospitalization    Memory   Immediate Recall: Repeated 3/3    Delayed Recall:   Recalled 3/3    Long Term Recall:   Recalled Address, Birthdate, Age, and Family    Organization/Problem Solving/Reasoning   Verbal Sequencing:   To be assessed        Verbal Problem solving:   Impaired          CLINICAL OBSERVATIONS NOTED DURING THE EVALUATION  Flat affect and Reduced eye contact                  EDUCATION:   The Speech Language Pathologist (SLP) completed education regarding results of evaluation and that intervention is warranted at this time.  Learner: Patient  Education: Reviewed results and recommendations of

## 2024-05-29 PROCEDURE — 2580000003 HC RX 258: Performed by: STUDENT IN AN ORGANIZED HEALTH CARE EDUCATION/TRAINING PROGRAM

## 2024-05-29 PROCEDURE — 6370000000 HC RX 637 (ALT 250 FOR IP): Performed by: STUDENT IN AN ORGANIZED HEALTH CARE EDUCATION/TRAINING PROGRAM

## 2024-05-29 PROCEDURE — 97530 THERAPEUTIC ACTIVITIES: CPT

## 2024-05-29 PROCEDURE — 99232 SBSQ HOSP IP/OBS MODERATE 35: CPT | Performed by: NURSE PRACTITIONER

## 2024-05-29 PROCEDURE — 2060000000 HC ICU INTERMEDIATE R&B

## 2024-05-29 PROCEDURE — 97161 PT EVAL LOW COMPLEX 20 MIN: CPT

## 2024-05-29 PROCEDURE — 99232 SBSQ HOSP IP/OBS MODERATE 35: CPT | Performed by: INTERNAL MEDICINE

## 2024-05-29 PROCEDURE — 6370000000 HC RX 637 (ALT 250 FOR IP): Performed by: INTERNAL MEDICINE

## 2024-05-29 PROCEDURE — 6360000002 HC RX W HCPCS: Performed by: STUDENT IN AN ORGANIZED HEALTH CARE EDUCATION/TRAINING PROGRAM

## 2024-05-29 RX ORDER — HYDROCHLOROTHIAZIDE 25 MG/1
25 TABLET ORAL DAILY
Status: DISCONTINUED | OUTPATIENT
Start: 2024-05-29 | End: 2024-05-31 | Stop reason: HOSPADM

## 2024-05-29 RX ADMIN — ATORVASTATIN CALCIUM 80 MG: 40 TABLET, FILM COATED ORAL at 19:38

## 2024-05-29 RX ADMIN — MAGNESIUM HYDROXIDE 30 ML: 400 SUSPENSION ORAL at 00:20

## 2024-05-29 RX ADMIN — IBUPROFEN 600 MG: 400 TABLET, FILM COATED ORAL at 19:38

## 2024-05-29 RX ADMIN — ENOXAPARIN SODIUM 40 MG: 100 INJECTION SUBCUTANEOUS at 09:19

## 2024-05-29 RX ADMIN — SODIUM CHLORIDE, PRESERVATIVE FREE 10 ML: 5 INJECTION INTRAVENOUS at 19:38

## 2024-05-29 RX ADMIN — CLOPIDOGREL BISULFATE 75 MG: 75 TABLET ORAL at 09:22

## 2024-05-29 RX ADMIN — HYDROCHLOROTHIAZIDE 25 MG: 25 TABLET ORAL at 09:22

## 2024-05-29 RX ADMIN — AMLODIPINE BESYLATE 10 MG: 10 TABLET ORAL at 09:22

## 2024-05-29 RX ADMIN — SODIUM CHLORIDE, PRESERVATIVE FREE 10 ML: 5 INJECTION INTRAVENOUS at 09:20

## 2024-05-29 ASSESSMENT — PAIN SCALES - GENERAL
PAINLEVEL_OUTOF10: 0
PAINLEVEL_OUTOF10: 6

## 2024-05-29 ASSESSMENT — PAIN DESCRIPTION - LOCATION: LOCATION: FOOT

## 2024-05-29 ASSESSMENT — PAIN SCALES - WONG BAKER: WONGBAKER_NUMERICALRESPONSE: NO HURT

## 2024-05-29 ASSESSMENT — PAIN DESCRIPTION - ORIENTATION: ORIENTATION: RIGHT;LEFT

## 2024-05-29 NOTE — PROGRESS NOTES
function - upper  Normal   VII: facial muscle function - lower Right facial droop   VIII: hearing Normal   IX: soft palate elevation  Normal   IX,X: gag reflex    XI: trapezius strength  5/5   XI: sternocleidomastoid strength 5/5   XI: neck extension strength  5/5   XII: tongue strength  Right sided weakness     Motor:  Right   4/5              Left   5/5               Right Bicep  4/5           Left Bicep  5/5              Right Triceps   4/5       Left Triceps  5/5          Right Deltoid  5/5     Left Deltoid  5/5                  Right Quadriceps  5/5          Left Quadriceps    5/5           Right Gastrocnemius    5/5    Left Gastrocnemius   5/5  Right Ant Tibialis   5/5  Left Ant Tibialis   5/5    Sensory:  LT intact    Coordination:   Cerebellar arm drift present on the right  Finger to nose abnormal on the right, normal heel to shin b/l  FELIPE    Gait:  Not tested safety concern    DTR:   Right Brachioradialis reflex 3+  Left Brachioradialis reflex 2+  Right Biceps reflex 3+  Left Biceps reflex 2+  Right Triceps reflex 3+  Left Triceps reflex 2+  Right Quadriceps reflex 2+  Left Quadriceps reflex 2+  Right Achilles reflex 1+  Left Achilles reflex 1+    No Babinskis    Laboratory/Radiology:     CBC with Differential:    Lab Results   Component Value Date/Time    WBC 6.7 05/28/2024 05:56 AM    RBC 4.89 05/28/2024 05:56 AM    HGB 14.5 05/28/2024 05:56 AM    HCT 44.9 05/28/2024 05:56 AM     05/28/2024 05:56 AM    MCV 91.8 05/28/2024 05:56 AM    MCH 29.7 05/28/2024 05:56 AM    MCHC 32.3 05/28/2024 05:56 AM    RDW 14.9 05/28/2024 05:56 AM    LYMPHOPCT 13 05/27/2024 09:15 AM    MONOPCT 8 05/27/2024 09:15 AM    EOSPCT 1 05/27/2024 09:15 AM    BASOPCT 0 05/27/2024 09:15 AM    MONOSABS 0.62 05/27/2024 09:15 AM    LYMPHSABS 2.80 08/10/2013 05:10 PM    EOSABS 0.04 05/27/2024 09:15 AM    BASOSABS 0.03 05/27/2024 09:15 AM     CMP:    Lab Results   Component Value Date/Time     05/27/2024 09:15 AM    K  4.1 05/27/2024 09:15 AM     05/27/2024 09:15 AM    CO2 21 05/27/2024 09:15 AM    BUN 11 05/27/2024 09:15 AM    CREATININE 0.9 05/27/2024 09:15 AM    GFRAA >60 08/27/2014 12:00 PM    LABGLOM 67 05/27/2024 09:15 AM    GLUCOSE 109 05/27/2024 09:15 AM    CALCIUM 9.9 05/27/2024 09:15 AM    BILITOT 0.6 05/27/2024 09:15 AM    ALKPHOS 89 05/27/2024 09:15 AM    AST 16 05/27/2024 09:15 AM    ALT 10 05/27/2024 09:15 AM     HgBA1c:    Lab Results   Component Value Date/Time    LABA1C 6.0 05/28/2024 05:56 AM     FLP:    Lab Results   Component Value Date/Time    TRIG 91 05/28/2024 05:56 AM    HDL 51 05/28/2024 05:56 AM   LDL:119    CT head wo contrast, CTAs head and neck, brain perfusion:  1. No acute intracranial abnormality.  2. Volume loss with chronic microvascular white matter ischemic disease that  is worse.  3. No evidence of flow-limiting stenosis or dissection of the cervical  carotid arteries.  4. Fibrocalcific plaque results in 50% stenoses in the cavernous ICAs  bilaterally.  5. 50% stenosis in the V2 segment of the right vertebral artery.  Poor  visualization of the left vertebral artery secondary to refluxed contrast  into paraspinal collateral veins.  6. No areas of hypoperfusion    MRI brain wo contrast:  Acute infarct in the left posterior limb internal capsule.         All pertinent labs and images personally reviewed today    Assessment:     Acute left posterior internal capsule ischemic infarct:  --- Risk factors for stroke include hypertension, hyperlipidemia and nicotine dependence.    --- no improvement of symptoms since admission.      Will continue to reduce modifiable risk factors, discussed smoking cessation.    Plan:     Echo pending  Continue plavix, patient has allergy to aspirin   Lipitor 80mg on admission  PT, OT, ST  DVT prophylaxis   Neuro to follow      MARLY Mitchell - CNP,   2:10 PM  5/29/2024

## 2024-05-29 NOTE — PROGRESS NOTES
Acute Rehab Pre-Admission Screen      Referral date: 05/29/2024  Onset/Hospital Admit Date: 5/27/2024  9:12 AM    Current Location: 8516/8516-B    Name: Rivka Gamboa  YOB: 1955  Age: 69 y.o.  Admitting Diagnosis: cva   Address: [address] [place] [address]  Home Phone: [phone] (home)  Social Security #:     Sex: female  Race:B. Black or   Ethnicity: A. No, not of ,  or English origin    Marital Status: single     Ethnic/Cultural/Tenriism Considerations: Jehovah's witness                ADVANCED DIRECTIVES:     Full code   Copies are in the chart                 COVERAGE INFORMATION   Primary Insurer: [facility]  Phone: [phone]  FAX:[phone]  Secondary Insurer: [facility]  Medicare #: [id number]     Verified coverage with : Patient, Financial department, and Insurance carrier        MEDICAL UPDATE:  History of present admission: On 5/27/2024, patient was presented to the ED with complaints of right sided upper and lower extremity weakness. Patient thought it was related to the initiation of amlodipine for hypertension at first, she had difficulty walking due to right leg weakness and fell at home few times, decided to come to ER for further evaluation.  Patient also endorsed left arm tingling sensation. Upon arrival to ED NIH SS was 5, blood pressure was 197/95. MRI revealed an acute infarct in the left posterior limb internal capsule. The patient was not a candidate for TNK or endovascular intervention.       PHYSICIAN / REFERRAL INFORMATION  Referring Physician: Anthony Simon  Attending Physician: Wandy Blackmon *  Primary Care Physician: Berry Khanna MD  Consultants/Opinions (see full consult notes on chart): Neurology, Neuro IR    SOCIAL INFORMATION  Primary  Contact: Su Gamboa  Relationship: child  Primary Phone: [phone]    Secondary  Contact: Lanny Palacios  Relationship: Child  Primary Phone: [phone]   home.        RECOMMENDED LEVEL OF CARE:    Inpatient rehabilitation is recommended        Physician Assigned: Dr. Puet       PHYSICIAN ADMISSION DETERMINATION AND REVIEW:    ____________________________________________________________________  ____________________________________________________________________  ____________________________________________________________________  ____________________________________________________________________  ____________________________________________________________________      Physician Signature:_____________________________________    Print Signature:_________________________________________    Date:   5/31/2024 Time:    2:36 PM

## 2024-05-29 NOTE — DISCHARGE INSTR - COC
Continuity of Care Form    Patient Name: Rivka Gamboa   :  1955  MRN:  [id number]    Admit date:  2024  Discharge date:     Code Status Order: Full Code   Advance Directives:     Admitting Physician:  No admitting provider for patient encounter.  PCP: Berry Khanna MD    Discharging Nurse: alex  Discharging Hospital Unit/Room#: 8516/8516-B  Discharging Unit Phone Number: [phone]    Emergency Contact:   Extended Emergency Contact Information  Primary Emergency Contact: [place] [address] [facility]  Home Phone: [phone]  Relation: Child  Secondary Emergency [place]: [place] [address] [facility]  Home Phone: [phone]  Relation: Child    Past Surgical History:  Past Surgical History:   Procedure Laterality Date    COLONOSCOPY  3/25/14    HYSTERECTOMY (CERVIX STATUS UNKNOWN)      [facility]     TONSILLECTOMY         Immunization History:     There is no immunization history on file for this patient.    Active Problems:  Patient Active Problem List   Diagnosis Code    HTN (hypertension) I10    Acute left SARIAH ischemic stroke (HCC) I63.522    Acute right-sided weakness R53.1       Isolation/Infection:   Isolation            No Isolation          Patient Infection Status       None to display            Nurse Assessment:  Last Vital Signs: BP (!) 175/84   Pulse 97   Temp 97.8 °F (36.6 °C) (Temporal)   Resp 16   Ht 1.702 m (5' 7\")   Wt 83.5 kg (184 lb)   SpO2 93%   BMI 28.82 kg/m²     Last documented pain score (0-10 scale): Pain Level: 6  Last Weight:   Wt Readings from Last 1 Encounters:   24 83.5 kg (184 lb)     Mental Status:  oriented and alert    IV Access:  - None    Nursing Mobility/ADLs:  Walking   Assisted  Transfer  Assisted  Bathing  Assisted  Dressing  Assisted  Toileting  Assisted  Feeding  Assisted  Med Admin  Assisted  Med Delivery   whole    Wound Care Documentation and Therapy:

## 2024-05-29 NOTE — PROGRESS NOTES
[facility] Hospitalist Progress Note    Admitting Date and Time: 5/27/2024  9:12 AM  Admit Dx: Acute ischemic stroke (HCC) [I63.9]  Acute right-sided weakness [R53.1]    Synopsis: 69-year-old female patient with history of hypertension, nicotine dependence, hyperlipidemia who presents to the ER with complaint of right-sided upper and lower extremity weakness since 5/26 no history of prior strokes or heart disease.  Patient with elevated blood pressure upon arrival. CT head without contrast with no acute intracranial abnormality. CT perfusion with no mismatch, CTA head and neck with no LVO but fibrocalcific plaque causing 50% stenosis in the cavernous ICAs bilateral and 50% stenosis in the V2 segment of the right vertebral artery. Telestroke was contacted and patient is not a candidate for TNK of endovascular procedure. Recommended admission for further assessment and initiation of DAPT. Patient is allergic to aspirin. Patient was given loading dose Plavix 300 mg on the ER     Subjective:  Patient seen and examined at bedside  Continues to have some issues with gripping objects such as her drink but otherwise strength is improving on the right side.    ROS: denies fever, chills, cp, sob, n/v, HA unless stated above.      hydroCHLOROthiazide  25 mg Oral Daily    amLODIPine  10 mg Oral Daily    sodium chloride flush  5-40 mL IntraVENous 2 times per day    enoxaparin  40 mg SubCUTAneous Daily    atorvastatin  80 mg Oral Nightly    clopidogrel  75 mg Oral Daily    nicotine  1 patch TransDERmal Daily     magnesium hydroxide, 30 mL, Daily PRN  ibuprofen, 600 mg, Q6H PRN  sodium chloride flush, 5-40 mL, PRN  sodium chloride, , PRN  ondansetron, 4 mg, Q8H PRN   Or  ondansetron, 4 mg, Q6H PRN  polyethylene glycol, 17 g, Daily PRN  labetalol, 10 mg, Q6H PRN  hydrALAZINE, 10 mg, Q6H PRN  melatonin, 3 mg, Nightly PRN  calcium carbonate, 500 mg, TID PRN  acetaminophen, 650 mg, Q4H PRN         Objective:    BP (!) 175/84   Result   1. No acute intracranial abnormality.   2. Volume loss with chronic microvascular white matter ischemic disease that   is worse.   3. No evidence of flow-limiting stenosis or dissection of the cervical   carotid arteries.   4. Fibrocalcific plaque results in 50% stenoses in the cavernous ICAs   bilaterally.   5. 50% stenosis in the V2 segment of the right vertebral artery.  Poor   visualization of the left vertebral artery secondary to refluxed contrast   into paraspinal collateral veins.   6. No areas of hypoperfusion.         CTA NECK W CONTRAST   Final Result   1. No acute intracranial abnormality.   2. Volume loss with chronic microvascular white matter ischemic disease that   is worse.   3. No evidence of flow-limiting stenosis or dissection of the cervical   carotid arteries.   4. Fibrocalcific plaque results in 50% stenoses in the cavernous ICAs   bilaterally.   5. 50% stenosis in the V2 segment of the right vertebral artery.  Poor   visualization of the left vertebral artery secondary to refluxed contrast   into paraspinal collateral veins.   6. No areas of hypoperfusion.         CT BRAIN PERFUSION   Final Result   1. No acute intracranial abnormality.   2. Volume loss with chronic microvascular white matter ischemic disease that   is worse.   3. No evidence of flow-limiting stenosis or dissection of the cervical   carotid arteries.   4. Fibrocalcific plaque results in 50% stenoses in the cavernous ICAs   bilaterally.   5. 50% stenosis in the V2 segment of the right vertebral artery.  Poor   visualization of the left vertebral artery secondary to refluxed contrast   into paraspinal collateral veins.   6. No areas of hypoperfusion.              Assessment:    Principal Problem:    Acute left SARIAH ischemic stroke (HCC)  Active Problems:    Acute right-sided weakness  Resolved Problems:    * No resolved hospital problems. *      Plan:  Acute ischemic stroke.  Late presentation.  Not a candidate for TNK

## 2024-05-29 NOTE — CARE COORDINATION
Chart reviewed and case reviewed in IDR.  Work-up completed yesterday.  Echo showing EF of 65%, negative for PFO.  PM&R consult received yesterday, Dr Tao to see and review for appropriateness for admission today.  Spoke with Khloe, liaison with ARU.  She spoke with patient and patient is agreeable to program and wants to participate.  Once consult received from Dr Tao, they will initiate authorization with the patient's insurance company for transition of care.  CATINA completed.  Will Continue to follow for further transition of care planning needs.       Francie Cuellar RN.  P:  [phone]

## 2024-05-29 NOTE — CARE COORDINATION
Met with the patient at the bedside.  Patient is appropriate for ARU. Patient states she would like to admit to our ARU. Patient lives alone in a second floor apartment with no elevator access. Patient was independent PTA and ambulated with no device.  Patient states she has family that can help her at discharge.  Will begin precert for ARU today once updated PT note in entered.

## 2024-05-29 NOTE — PROGRESS NOTES
Initial Assessment     Name: Rivka Gamboa  : 1955  MRN: [id number]      Date of Service: 2024    Evaluating PT: Yefri Malave, PT       Room #:  8516/8516-B  Diagnosis:  Acute ischemic stroke (HCC) [I63.9]  Acute right-sided weakness [R53.1]  PMHx/PSHx:   has a past medical history of Hypertension and Pneumonia.  Procedure/Surgery:  none  Precautions:  Falls, FWB status, R weakness, bed alarm  Equipment Needs:  TBD    SUBJECTIVE:    Pt lives alone in a 2nd story apartment with 6 stair(s) and 1 rail(s) to enter. No elevator access. Pt ambulated with no assistive device prior to admission. Does not currently own any DME.    OBJECTIVE:   Initial Evaluation  Date: 24 Treatment Date: Short Term/ Long Term   Goals   AM-PAC 6 Clicks      Was pt agreeable to Eval/treatment? Yes     Does pt have pain? No     Bed Mobility  Rolling: Min  Supine to sit:   Min   Sit to supine: Min   Scooting: Min   Rolling: Ind  Supine to sit: Ind  Sit to supine: Ind  Scooting: Ind   Transfers Sit to stand: Mod x 2 to Wheeled Walker  Stand to sit: Mod   Stand pivot: Mod x 2 with Wheeled Walker  Sit to stand: Mod Ind  to AAD  Stand to sit: Ind   Stand pivot: Mod Ind  with AAD   Ambulation   20 feet with Wheeled Walker  and Mod x 2    20 feet with HHA and Mod x 2  75 feet with Min and AAD   Stair negotiation: ascended and descended NT  6 steps with Mod  1 rail    ROM BUE: Refer to OT note  BLE: WFL     Strength BUE: Refer to OT note  BLE: LLE: WFL        RLE: grossly 4-/5     Balance Sitting EOB: CGA  Dynamic Standing: Mod Ax2 with FWW  Sitting EOB: Ind  Dynamic Standing: Mod Ind with AAD     Pt is A & O x: 3 - patient alert to self, location, and month/year  Sensation: NT  Edema: None noted    Vitals:  WFL throughout session.    Therapeutic Exercises:  Functional activities completed as noted in grid including ambulation for 20 feet with mod x2 and FWW.    Patient education  Pt educated on role of PT, safety and POC,  importance of mobilization while in the hospital, and use of call light for safety.    Patient response to education:   Pt verbalized understanding Pt demonstrated skill Pt requires further education in this area   Yes Partial Yes     ASSESSMENT:    Conditions Requiring Skilled Therapeutic Intervention:    [x]Decreased strength     []Decreased ROM  [x]Decreased functional mobility  [x]Decreased balance   [x]Decreased endurance   [x]Decreased posture  []Decreased sensation  [x]Decreased coordination   []Decreased vision  [x]Decreased safety awareness   []Increased pain     Comments:    RN cleared this patient to participate in PT evaluation. Patient was agreeable to evaluation. Upon entering the room, patient was found supine in bed. Functional assessment findings and assist levels as noted above in grid. Patient transferred to sit EOB with verbal and tactile cues for sequencing. Sat EOB x 5 minutes in order to improve tolerance to upright and sitting balance. Sitting posture was upright with no LOB. Dynamic sitting balance was good with no LOB. Patient performed sit to stand transfer to FWW with verbal and tactile cues for hand placement and safety with AD use. The patient ambulated with a right lean and shuffled gait pattern. Decreased step length, speed, and R foot clearance noted. Ambulation had minimal change without use of an assistive device. Patient was returned to EOB and positioned supine in bed for comfort. Call light and patient belongings available and in reach. All needs met, all lines and tubes in tact.   This patient will benefit from continued PT possibly in a rehab setting in order to improve balance, strength, endurance, activity tolerance, postural awareness, and safety with functional mobility.    Treatment:  Patient practiced and was instructed in the following treatment:    Ambulation - verbal and tactile cues necessary for step length and foot clearance.  Transfers - Verbal and tactile cues

## 2024-05-30 PROCEDURE — 97530 THERAPEUTIC ACTIVITIES: CPT

## 2024-05-30 PROCEDURE — 2580000003 HC RX 258: Performed by: STUDENT IN AN ORGANIZED HEALTH CARE EDUCATION/TRAINING PROGRAM

## 2024-05-30 PROCEDURE — 2060000000 HC ICU INTERMEDIATE R&B

## 2024-05-30 PROCEDURE — 6370000000 HC RX 637 (ALT 250 FOR IP): Performed by: INTERNAL MEDICINE

## 2024-05-30 PROCEDURE — 99232 SBSQ HOSP IP/OBS MODERATE 35: CPT | Performed by: INTERNAL MEDICINE

## 2024-05-30 PROCEDURE — 6370000000 HC RX 637 (ALT 250 FOR IP): Performed by: STUDENT IN AN ORGANIZED HEALTH CARE EDUCATION/TRAINING PROGRAM

## 2024-05-30 PROCEDURE — 6360000002 HC RX W HCPCS: Performed by: STUDENT IN AN ORGANIZED HEALTH CARE EDUCATION/TRAINING PROGRAM

## 2024-05-30 PROCEDURE — 97535 SELF CARE MNGMENT TRAINING: CPT

## 2024-05-30 RX ORDER — GABAPENTIN 100 MG/1
100 CAPSULE ORAL 3 TIMES DAILY
Status: DISCONTINUED | OUTPATIENT
Start: 2024-05-30 | End: 2024-05-31 | Stop reason: HOSPADM

## 2024-05-30 RX ADMIN — GABAPENTIN 100 MG: 100 CAPSULE ORAL at 08:27

## 2024-05-30 RX ADMIN — AMLODIPINE BESYLATE 10 MG: 10 TABLET ORAL at 08:27

## 2024-05-30 RX ADMIN — ENOXAPARIN SODIUM 40 MG: 100 INJECTION SUBCUTANEOUS at 08:27

## 2024-05-30 RX ADMIN — IBUPROFEN 600 MG: 400 TABLET, FILM COATED ORAL at 20:03

## 2024-05-30 RX ADMIN — GABAPENTIN 100 MG: 100 CAPSULE ORAL at 20:03

## 2024-05-30 RX ADMIN — SODIUM CHLORIDE, PRESERVATIVE FREE 10 ML: 5 INJECTION INTRAVENOUS at 20:05

## 2024-05-30 RX ADMIN — HYDROCHLOROTHIAZIDE 25 MG: 25 TABLET ORAL at 08:27

## 2024-05-30 RX ADMIN — CLOPIDOGREL BISULFATE 75 MG: 75 TABLET ORAL at 08:27

## 2024-05-30 RX ADMIN — ATORVASTATIN CALCIUM 80 MG: 40 TABLET, FILM COATED ORAL at 20:04

## 2024-05-30 RX ADMIN — SODIUM CHLORIDE, PRESERVATIVE FREE 10 ML: 5 INJECTION INTRAVENOUS at 08:28

## 2024-05-30 RX ADMIN — GABAPENTIN 100 MG: 100 CAPSULE ORAL at 13:40

## 2024-05-30 ASSESSMENT — PAIN SCALES - GENERAL
PAINLEVEL_OUTOF10: 0
PAINLEVEL_OUTOF10: 6
PAINLEVEL_OUTOF10: 0

## 2024-05-30 ASSESSMENT — PAIN SCALES - WONG BAKER: WONGBAKER_NUMERICALRESPONSE: NO HURT

## 2024-05-30 NOTE — PROGRESS NOTES
Notified Dr Anthony phillip pt states she has \"burning\" in her feet and wants to know if theres something we can get for this

## 2024-05-30 NOTE — PROGRESS NOTES
Pt was advised of Echo results with no PFO.    Plan:  Continue Plavix  --- aspirin allergy  Continue statin  PT/OT/SLP  Stroke clinic follow up  Neurology to sign off

## 2024-05-30 NOTE — PROGRESS NOTES
PRN  polyethylene glycol, 17 g, Daily PRN  labetalol, 10 mg, Q6H PRN  hydrALAZINE, 10 mg, Q6H PRN  melatonin, 3 mg, Nightly PRN  calcium carbonate, 500 mg, TID PRN  acetaminophen, 650 mg, Q4H PRN         Objective:    /84   Pulse 81   Temp 97.6 °F (36.4 °C) (Temporal)   Resp 18   Ht 1.702 m (5' 7\")   Wt 83.5 kg (184 lb)   SpO2 96%   BMI 28.82 kg/m²     General Appearance: alert and oriented to person, place and time and in no acute distress  Skin: warm and dry  Head: normocephalic and atraumatic  Eyes: pupils equal, round, and reactive to light, extraocular eye movements intact, conjunctivae normal  Neck: neck supple and non tender without mass   Pulmonary/Chest: clear to auscultation bilaterally- no wheezes, rales or rhonchi, normal air movement, no respiratory distress  Cardiovascular: normal rate, normal S1 and S2 and no carotid bruits  Abdomen: soft, non-tender, non-distended, normal bowel sounds, no masses or organomegaly  Extremities: no cyanosis, no clubbing and no edema  Neurologic: no cranial nerve deficit and speech normal strength 4 out of 5 right upper extremity        No results for input(s): \"NA\", \"K\", \"CL\", \"CO2\", \"BUN\", \"CREATININE\", \"GLUCOSE\", \"CALCIUM\" in the last 72 hours.      Recent Labs     05/28/24  0556   WBC 6.7   RBC 4.89   HGB 14.5   HCT 44.9   MCV 91.8   MCH 29.7   MCHC 32.3   RDW 14.9      MPV 10.6         Radiology:   MRI brain without contrast   Final Result   Acute infarct in the left posterior limb internal capsule.         CT Head W/O Contrast   Final Result   1. No acute intracranial abnormality.   2. Volume loss with chronic microvascular white matter ischemic disease that   is worse.   3. No evidence of flow-limiting stenosis or dissection of the cervical   carotid arteries.   4. Fibrocalcific plaque results in 50% stenoses in the cavernous ICAs   bilaterally.   5. 50% stenosis in the V2 segment of the right vertebral artery.  Poor   visualization of the left

## 2024-05-30 NOTE — PLAN OF CARE
Problem: Chronic Conditions and Co-morbidities  Goal: Patient's chronic conditions and co-morbidity symptoms are monitored and maintained or improved  Outcome: Progressing  Flowsheets  Taken 5/29/2024 2112  Care Plan - Patient's Chronic Conditions and Co-Morbidity Symptoms are Monitored and Maintained or Improved:   Monitor and assess patient's chronic conditions and comorbid symptoms for stability, deterioration, or improvement   Collaborate with multidisciplinary team to address chronic and comorbid conditions and prevent exacerbation or deterioration   Update acute care plan with appropriate goals if chronic or comorbid symptoms are exacerbated and prevent overall improvement and discharge  Taken 5/29/2024 2108  Care Plan - Patient's Chronic Conditions and Co-Morbidity Symptoms are Monitored and Maintained or Improved:   Monitor and assess patient's chronic conditions and comorbid symptoms for stability, deterioration, or improvement   Collaborate with multidisciplinary team to address chronic and comorbid conditions and prevent exacerbation or deterioration   Update acute care plan with appropriate goals if chronic or comorbid symptoms are exacerbated and prevent overall improvement and discharge     Problem: Discharge Planning  Goal: Discharge to home or other facility with appropriate resources  Outcome: Progressing  Flowsheets (Taken 5/29/2024 2108)  Discharge to home or other facility with appropriate resources:   Identify barriers to discharge with patient and caregiver   Identify discharge learning needs (meds, wound care, etc)     Problem: Pain  Goal: Verbalizes/displays adequate comfort level or baseline comfort level  Outcome: Progressing  Flowsheets (Taken 5/29/2024 2112)  Verbalizes/displays adequate comfort level or baseline comfort level:   Assess pain using appropriate pain scale   Encourage patient to monitor pain and request assistance   Administer analgesics based on type and severity of pain  and evaluate response   Implement non-pharmacological measures as appropriate and evaluate response   Consider cultural and social influences on pain and pain management     Problem: Safety - Adult  Goal: Free from fall injury  Outcome: Progressing     Problem: Skin/Tissue Integrity  Goal: Absence of new skin breakdown  Description: 1.  Monitor for areas of redness and/or skin breakdown  2.  Assess vascular access sites hourly  3.  Every 4-6 hours minimum:  Change oxygen saturation probe site  4.  Every 4-6 hours:  If on nasal continuous positive airway pressure, respiratory therapy assess nares and determine need for appliance change or resting period.  Outcome: Progressing

## 2024-05-30 NOTE — PROGRESS NOTES
Notified Dr Anthony Simon pt states her right lower calf is experiencing \"sharp\" pain, no redness or swelling noted  Okay to order dvt us per Dr Sandor prado

## 2024-05-30 NOTE — CARE COORDINATION
Chart reviewed and case reviewed in IDR.  Work-up completed yesterday.  Echo showing EF of 65%, negative for PFO.  PM&R consult received yesterday, Dr Tao accepted and precert initiated yesterday for transition of care to ARU at [facility] yesterday.  Await authorization for transition of care to rehab.  CATINA completed.  Will Continue to follow for further transition of care planning needs.       Francie Cuellar RN.  P:  [phone]

## 2024-05-30 NOTE — PROGRESS NOTES
Occupational Therapy  OT BEDSIDE TREATMENT NOTE   [facility]  [address], [place]       Date:2024  Patient Name: Rivka Gamboa  MRN: [id number]  : 1955  Room: [facility]     Per OT Eval:    Evaluating OT: Reggie Pink OTR/L [id number]     Referring Provider: Milanes Marino, Maria, MD                                Specific Provider Orders/Date: OT evaluation and treatment 24 1145     Diagnosis:  Acute ischemic stroke (HCC) [I63.9]  Acute right-sided weakness [R53.1]       Pertinent Medical History:  has a past medical history of Hypertension and Pneumonia.   Past Surgical History         Past Surgical History:   Procedure Laterality Date    COLONOSCOPY   3/25/14    HYSTERECTOMY (CERVIX STATUS UNKNOWN)        [facility]     TONSILLECTOMY                Pt admitted to the hospital  with R sided weakness      Orders received, chart reviewed, eval complete.      Precautions:  Fall Risk, R sided weakness, cognition, +alarms      Assessment of current deficits   [x] Functional mobility             [x]ADLs           [x] Strength                  [x]Cognition   [x] Functional transfers           [x] IADLs         [x] Safety Awareness   [x]Endurance   [x] Fine Motor Coordination    [x] Balance      [] Vision/perception    [x]Sensation     [x] Gross Motor Coordination [x] ROM          [] Delirium                  [] Motor Control      OT PLAN OF CARE   OT POC based on physician orders, patient diagnosis and results of clinical assessment     Frequency/Duration 2-4 days/wk for 2 weeks PRN   Specific OT Treatment to include:   * Instruction/training on adapted ADL techniques and AE recommendations to increase functional independence within precautions       * Training on energy conservation strategies, correct breathing pattern and techniques to improve independence/tolerance for self-care routine  * Functional  commode   Mod A  Attempted to urinate with no results, pt required assist to manage clothing on R hip  Stand by assist    Bed Mobility  Rolling L/R: NT   Supine to sit: Min A    Sit to supine: Min A    CGA  Supine to sit  Supine to sit: Mod I   Sit to supine: Mod I    Functional Transfers Sit to stand: Min A    Stand to sit: Min A    Stand pivot: NT   Mod A  Sit < > Stand  Stand pivot with walker  Cues for hand placement & technique, R inattention, R LE weakness  Cues needed for walker management & to correct midline orientation, R lateral lean  Stand by assist    Functional Mobility Mod A  with no AD to the restroom and with FWW back from restroom   Mod A with walker  Able to walk in & out of bathroom with assist, cues was walker management, midline orientation, along with pt able to walk with PTA in hallway short distances, drifting to Right Stand by assist   with AAD    Balance Sitting: Stand by assist       Standing: Mod A    Sitting: SBA/CGA  Standing: Mod A with walker  Sitting: Mod I       Standing: Stand by assist    Activity Tolerance Fair+     Fair+ Good   Visual/  Perceptual wears glasses and for reading only      perception: WFL              BUE  ROM/Strength/  Fine motor Coordination Hand dominance: right      RUE: ROM WFL  however shoulder limited compared to LUE     Strength: grossly 3+/5      Strength: WFL      Coordination:  impaired     LUE: ROM WFL      Strength: grossly 4+/5      Strength: WFL      Coordination: WFL   instructed pt on R UE therapeutic exercises due to decreased strength & coordination in all planes with Fair+ understanding. Pt will participate in BUE exercise with supervision to increase strength, ROM, and coordination for increased independence in functional mobility and ADLs    Safety   Fair  Fair   Mildly impulsive  Good during functional activity      Education:  Pt was educated through out treatment regarding proper technique & safety with bed mobility, functional

## 2024-05-30 NOTE — PROGRESS NOTES
Treatment Note  Name: Rivka Gamboa  : 1955  MRN: [id number]      Date of Service: 2024    Evaluating PT: Yefri Malave PT       Room #:  8516/8516-B  Diagnosis:  Acute ischemic stroke (HCC) [I63.9]  Acute right-sided weakness [R53.1]  PMHx/PSHx:   has a past medical history of Hypertension and Pneumonia.  Procedure/Surgery:  none  Precautions:  Falls, FWB status, R weakness, bed alarm  Equipment Needs:  TBD    SUBJECTIVE:    Pt lives alone in a 2nd story apartment with 6 stair(s) and 1 rail(s) to enter. No elevator access. Pt ambulated with no assistive device prior to admission. Does not currently own any DME.    OBJECTIVE:   Initial Evaluation  Date: 24 Treatment Date:  24 Short Term/ Long Term   Goals   AM-PAC 6 Clicks     Was pt agreeable to Eval/treatment? Yes Yes     Does pt have pain? No None     Bed Mobility  Rolling: Min  Supine to sit:   Min   Sit to supine: Min   Scooting: Min  Rolling SBA  Supine to sit SBA  Scooting SBA Rolling: Ind  Supine to sit: Ind  Sit to supine: Ind  Scooting: Ind   Transfers Sit to stand: Mod x 2 to Wheeled Walker  Stand to sit: Mod   Stand pivot: Mod x 2 with Wheeled Walker Sit to stand mod A   Stand to sit mod A  Stand pivot with ww with mod A  Sit to stand: Mod Ind  to AAD  Stand to sit: Ind   Stand pivot: Mod Ind  with AAD   Ambulation   20 feet with Wheeled Walker  and Mod x 2    20 feet with HHA and Mod x 2 40 feet x2 with ww with mod A    75 feet with Min and AAD   Stair negotiation: ascended and descended NT Nt  6 steps with Mod  1 rail    ROM BUE: Refer to OT note  BLE: WFL     Strength BUE: Refer to OT note  BLE: LLE: WFL        RLE: grossly 4-/5     Balance Sitting EOB: CGA  Dynamic Standing: Mod Ax2 with FWW  Sitting EOB: Ind  Dynamic Standing: Mod Ind with AAD         Therapeutic Exercises:  Ankle  pumps ( R LE x5)   LAQS ( R LE x5)   Marching ( R LE x5 )     Patient education  Pt educated on increased R side awareness     Patient  treat  Start:  05/27/24 1145,   End:  05/27/24 1145,   ONE TIME,   Standing Count:  1 Occurrences,   R        Order went unreviewed at transfer on Mon May 27, 2024  3:04 PM    Milanes Marino, Maria, MD     Diagnosis:  Acute ischemic stroke (HCC) [I63.9]  Acute right-sided weakness [R53.1]  Specific instructions for next treatment:  Continue to progress ambulation with appropriate assistive device as able.    Current Treatment Recommendations:     [x] Strengthening to improve independence with functional mobility   [] ROM to improve independence with functional mobility   [x] Balance Training to improve static/dynamic balance and to reduce fall risk  [x] Endurance Training to improve activity tolerance during functional mobility   [x] Transfer Training to improve safety and independence with all functional transfers   [x] Gait Training to improve gait mechanics, endurance and assess need for appropriate assistive device  [x] Stair Training in preparation for safe discharge home and/or into the community   [] Positioning to prevent skin breakdown and contractures  [x] Safety and Education Training   [] Patient/Caregiver Education   [] HEP  [] Other     PT long term treatment goals are located in above grid    Frequency of treatments: 2-5x/week x 1-2 weeks.    Time in 0915  Time out  0945    Total Treatment Time  30 minutes         CPT codes:  [] Low Complexity PT evaluation 49171  [] Moderate Complexity PT evaluation 49940  [] High Complexity PT evaluation 37474  [] PT Re-evaluation 19325  [] Gait training 50633 minutes  [] Manual therapy 86753 minutes  [x] Therapeutic activities 48398  30 minutes  [] Therapeutic exercises 95794 minutes  [] Neuromuscular reeducation 81284 minutes     Beth Lombardo [id number]

## 2024-05-30 NOTE — PROGRESS NOTES
Pt c/o HA at time of shift change. Pt requested \"ibuprofen.\" Prn ibuprofen taken to patient. Pt then c/o bilateral foot \"burning.\" Pt reported it \"just started this evening.\" MD notified on [employer]serve.   Kaila Fontanez RN

## 2024-05-31 ENCOUNTER — HOSPITAL ENCOUNTER (INPATIENT)
Age: 69
LOS: 12 days | Discharge: HOME HEALTH | DRG: 057 | End: 2024-06-12
Attending: PHYSICAL MEDICINE & REHABILITATION | Admitting: PHYSICAL MEDICINE & REHABILITATION
Payer: MEDICARE

## 2024-05-31 ENCOUNTER — APPOINTMENT (OUTPATIENT)
Dept: ULTRASOUND IMAGING | Age: 69
End: 2024-05-31
Payer: MEDICARE

## 2024-05-31 VITALS
BODY MASS INDEX: 28.88 KG/M2 | TEMPERATURE: 98.5 F | SYSTOLIC BLOOD PRESSURE: 141 MMHG | OXYGEN SATURATION: 98 % | DIASTOLIC BLOOD PRESSURE: 84 MMHG | RESPIRATION RATE: 18 BRPM | HEART RATE: 81 BPM | HEIGHT: 67 IN | WEIGHT: 184 LBS

## 2024-05-31 PROBLEM — I63.9 ACUTE CEREBROVASCULAR ACCIDENT (CVA) (HCC): Status: ACTIVE | Noted: 2024-05-31

## 2024-05-31 PROCEDURE — 1280000000 HC REHAB R&B

## 2024-05-31 PROCEDURE — 99239 HOSP IP/OBS DSCHRG MGMT >30: CPT | Performed by: INTERNAL MEDICINE

## 2024-05-31 PROCEDURE — 2580000003 HC RX 258: Performed by: STUDENT IN AN ORGANIZED HEALTH CARE EDUCATION/TRAINING PROGRAM

## 2024-05-31 PROCEDURE — 6360000002 HC RX W HCPCS: Performed by: STUDENT IN AN ORGANIZED HEALTH CARE EDUCATION/TRAINING PROGRAM

## 2024-05-31 PROCEDURE — 97535 SELF CARE MNGMENT TRAINING: CPT

## 2024-05-31 PROCEDURE — 6370000000 HC RX 637 (ALT 250 FOR IP): Performed by: INTERNAL MEDICINE

## 2024-05-31 PROCEDURE — 97530 THERAPEUTIC ACTIVITIES: CPT

## 2024-05-31 PROCEDURE — 93971 EXTREMITY STUDY: CPT

## 2024-05-31 PROCEDURE — 6370000000 HC RX 637 (ALT 250 FOR IP): Performed by: STUDENT IN AN ORGANIZED HEALTH CARE EDUCATION/TRAINING PROGRAM

## 2024-05-31 RX ORDER — LANOLIN ALCOHOL/MO/W.PET/CERES
3 CREAM (GRAM) TOPICAL NIGHTLY PRN
Status: CANCELLED | OUTPATIENT
Start: 2024-05-31

## 2024-05-31 RX ORDER — HYDRALAZINE HYDROCHLORIDE 20 MG/ML
10 INJECTION INTRAMUSCULAR; INTRAVENOUS EVERY 6 HOURS PRN
Status: CANCELLED | OUTPATIENT
Start: 2024-05-31

## 2024-05-31 RX ORDER — ATORVASTATIN CALCIUM 40 MG/1
80 TABLET, FILM COATED ORAL NIGHTLY
Status: CANCELLED | OUTPATIENT
Start: 2024-05-31

## 2024-05-31 RX ORDER — POLYETHYLENE GLYCOL 3350 17 G/17G
17 POWDER, FOR SOLUTION ORAL DAILY PRN
Status: DISCONTINUED | OUTPATIENT
Start: 2024-05-31 | End: 2024-06-12 | Stop reason: HOSPADM

## 2024-05-31 RX ORDER — ONDANSETRON 4 MG/1
4 TABLET, ORALLY DISINTEGRATING ORAL EVERY 8 HOURS PRN
Status: DISCONTINUED | OUTPATIENT
Start: 2024-05-31 | End: 2024-06-12 | Stop reason: HOSPADM

## 2024-05-31 RX ORDER — SODIUM CHLORIDE 9 MG/ML
INJECTION, SOLUTION INTRAVENOUS PRN
Status: CANCELLED | OUTPATIENT
Start: 2024-05-31

## 2024-05-31 RX ORDER — CALCIUM CARBONATE 500 MG/1
500 TABLET, CHEWABLE ORAL 3 TIMES DAILY PRN
Status: DISCONTINUED | OUTPATIENT
Start: 2024-05-31 | End: 2024-06-12 | Stop reason: HOSPADM

## 2024-05-31 RX ORDER — CALCIUM CARBONATE 500 MG/1
500 TABLET, CHEWABLE ORAL 3 TIMES DAILY PRN
Status: CANCELLED | OUTPATIENT
Start: 2024-05-31

## 2024-05-31 RX ORDER — HYDROCHLOROTHIAZIDE 25 MG/1
25 TABLET ORAL DAILY
Status: CANCELLED | OUTPATIENT
Start: 2024-06-01

## 2024-05-31 RX ORDER — ENOXAPARIN SODIUM 100 MG/ML
40 INJECTION SUBCUTANEOUS DAILY
Status: DISCONTINUED | OUTPATIENT
Start: 2024-06-01 | End: 2024-06-12 | Stop reason: HOSPADM

## 2024-05-31 RX ORDER — SODIUM CHLORIDE 9 MG/ML
INJECTION, SOLUTION INTRAVENOUS PRN
Status: DISCONTINUED | OUTPATIENT
Start: 2024-05-31 | End: 2024-06-12 | Stop reason: HOSPADM

## 2024-05-31 RX ORDER — CLOPIDOGREL BISULFATE 75 MG/1
75 TABLET ORAL DAILY
Status: DISCONTINUED | OUTPATIENT
Start: 2024-06-01 | End: 2024-06-12 | Stop reason: HOSPADM

## 2024-05-31 RX ORDER — SODIUM CHLORIDE 0.9 % (FLUSH) 0.9 %
5-40 SYRINGE (ML) INJECTION EVERY 12 HOURS SCHEDULED
Status: CANCELLED | OUTPATIENT
Start: 2024-05-31

## 2024-05-31 RX ORDER — LANOLIN ALCOHOL/MO/W.PET/CERES
3 CREAM (GRAM) TOPICAL NIGHTLY PRN
Status: DISCONTINUED | OUTPATIENT
Start: 2024-05-31 | End: 2024-06-12 | Stop reason: HOSPADM

## 2024-05-31 RX ORDER — ACETAMINOPHEN 325 MG/1
650 TABLET ORAL EVERY 4 HOURS PRN
Status: DISCONTINUED | OUTPATIENT
Start: 2024-05-31 | End: 2024-05-31 | Stop reason: SDUPTHER

## 2024-05-31 RX ORDER — ONDANSETRON 4 MG/1
4 TABLET, ORALLY DISINTEGRATING ORAL EVERY 8 HOURS PRN
Status: CANCELLED | OUTPATIENT
Start: 2024-05-31

## 2024-05-31 RX ORDER — ONDANSETRON 2 MG/ML
4 INJECTION INTRAMUSCULAR; INTRAVENOUS EVERY 6 HOURS PRN
Status: CANCELLED | OUTPATIENT
Start: 2024-05-31

## 2024-05-31 RX ORDER — NICOTINE 21 MG/24HR
1 PATCH, TRANSDERMAL 24 HOURS TRANSDERMAL DAILY
Status: DISCONTINUED | OUTPATIENT
Start: 2024-06-01 | End: 2024-06-12 | Stop reason: HOSPADM

## 2024-05-31 RX ORDER — SODIUM CHLORIDE 0.9 % (FLUSH) 0.9 %
5-40 SYRINGE (ML) INJECTION PRN
Status: CANCELLED | OUTPATIENT
Start: 2024-05-31

## 2024-05-31 RX ORDER — HYDRALAZINE HYDROCHLORIDE 20 MG/ML
10 INJECTION INTRAMUSCULAR; INTRAVENOUS EVERY 6 HOURS PRN
Status: DISCONTINUED | OUTPATIENT
Start: 2024-05-31 | End: 2024-06-12 | Stop reason: HOSPADM

## 2024-05-31 RX ORDER — POLYETHYLENE GLYCOL 3350 17 G/17G
17 POWDER, FOR SOLUTION ORAL DAILY PRN
Status: CANCELLED | OUTPATIENT
Start: 2024-05-31

## 2024-05-31 RX ORDER — LABETALOL HYDROCHLORIDE 5 MG/ML
10 INJECTION, SOLUTION INTRAVENOUS EVERY 6 HOURS PRN
Status: DISCONTINUED | OUTPATIENT
Start: 2024-05-31 | End: 2024-06-12 | Stop reason: HOSPADM

## 2024-05-31 RX ORDER — ONDANSETRON 2 MG/ML
4 INJECTION INTRAMUSCULAR; INTRAVENOUS EVERY 6 HOURS PRN
Status: DISCONTINUED | OUTPATIENT
Start: 2024-05-31 | End: 2024-06-12 | Stop reason: HOSPADM

## 2024-05-31 RX ORDER — SODIUM CHLORIDE 0.9 % (FLUSH) 0.9 %
5-40 SYRINGE (ML) INJECTION PRN
Status: DISCONTINUED | OUTPATIENT
Start: 2024-05-31 | End: 2024-06-12 | Stop reason: HOSPADM

## 2024-05-31 RX ORDER — ACETAMINOPHEN 325 MG/1
650 TABLET ORAL EVERY 4 HOURS PRN
Status: DISCONTINUED | OUTPATIENT
Start: 2024-05-31 | End: 2024-06-12 | Stop reason: HOSPADM

## 2024-05-31 RX ORDER — POLYETHYLENE GLYCOL 3350 17 G/17G
17 POWDER, FOR SOLUTION ORAL DAILY PRN
Status: DISCONTINUED | OUTPATIENT
Start: 2024-05-31 | End: 2024-05-31 | Stop reason: SDUPTHER

## 2024-05-31 RX ORDER — GABAPENTIN 100 MG/1
100 CAPSULE ORAL 3 TIMES DAILY
Status: DISCONTINUED | OUTPATIENT
Start: 2024-05-31 | End: 2024-06-12 | Stop reason: HOSPADM

## 2024-05-31 RX ORDER — IBUPROFEN 600 MG/1
600 TABLET ORAL EVERY 6 HOURS PRN
Status: DISCONTINUED | OUTPATIENT
Start: 2024-05-31 | End: 2024-06-12 | Stop reason: HOSPADM

## 2024-05-31 RX ORDER — SODIUM CHLORIDE 0.9 % (FLUSH) 0.9 %
5-40 SYRINGE (ML) INJECTION EVERY 12 HOURS SCHEDULED
Status: DISCONTINUED | OUTPATIENT
Start: 2024-05-31 | End: 2024-06-06

## 2024-05-31 RX ORDER — HYDROCHLOROTHIAZIDE 25 MG/1
25 TABLET ORAL DAILY
Status: DISCONTINUED | OUTPATIENT
Start: 2024-06-01 | End: 2024-06-12 | Stop reason: HOSPADM

## 2024-05-31 RX ORDER — ATORVASTATIN CALCIUM 80 MG/1
80 TABLET, FILM COATED ORAL NIGHTLY
Status: DISCONTINUED | OUTPATIENT
Start: 2024-05-31 | End: 2024-06-12 | Stop reason: HOSPADM

## 2024-05-31 RX ORDER — AMLODIPINE BESYLATE 5 MG/1
5 TABLET ORAL DAILY
Status: ON HOLD | COMMUNITY
End: 2024-06-11 | Stop reason: HOSPADM

## 2024-05-31 RX ORDER — ACETAMINOPHEN 325 MG/1
650 TABLET ORAL EVERY 4 HOURS PRN
Status: CANCELLED | OUTPATIENT
Start: 2024-05-31

## 2024-05-31 RX ORDER — AMLODIPINE BESYLATE 10 MG/1
10 TABLET ORAL DAILY
Status: CANCELLED | OUTPATIENT
Start: 2024-06-01

## 2024-05-31 RX ORDER — CLOPIDOGREL BISULFATE 75 MG/1
75 TABLET ORAL DAILY
Status: CANCELLED | OUTPATIENT
Start: 2024-06-01

## 2024-05-31 RX ORDER — AMLODIPINE BESYLATE 10 MG/1
10 TABLET ORAL DAILY
Status: DISCONTINUED | OUTPATIENT
Start: 2024-06-01 | End: 2024-06-12 | Stop reason: HOSPADM

## 2024-05-31 RX ORDER — ENOXAPARIN SODIUM 100 MG/ML
40 INJECTION SUBCUTANEOUS DAILY
Status: CANCELLED | OUTPATIENT
Start: 2024-06-01

## 2024-05-31 RX ORDER — LABETALOL HYDROCHLORIDE 5 MG/ML
10 INJECTION, SOLUTION INTRAVENOUS EVERY 6 HOURS PRN
Status: CANCELLED | OUTPATIENT
Start: 2024-05-31

## 2024-05-31 RX ORDER — NICOTINE 21 MG/24HR
1 PATCH, TRANSDERMAL 24 HOURS TRANSDERMAL DAILY
Status: CANCELLED | OUTPATIENT
Start: 2024-06-01

## 2024-05-31 RX ORDER — GABAPENTIN 100 MG/1
100 CAPSULE ORAL 3 TIMES DAILY
Status: CANCELLED | OUTPATIENT
Start: 2024-05-31

## 2024-05-31 RX ORDER — IBUPROFEN 400 MG/1
600 TABLET ORAL EVERY 6 HOURS PRN
Status: CANCELLED | OUTPATIENT
Start: 2024-05-31

## 2024-05-31 RX ADMIN — GABAPENTIN 100 MG: 100 CAPSULE ORAL at 20:41

## 2024-05-31 RX ADMIN — CLOPIDOGREL BISULFATE 75 MG: 75 TABLET ORAL at 09:13

## 2024-05-31 RX ADMIN — GABAPENTIN 100 MG: 100 CAPSULE ORAL at 09:13

## 2024-05-31 RX ADMIN — ENOXAPARIN SODIUM 40 MG: 100 INJECTION SUBCUTANEOUS at 09:13

## 2024-05-31 RX ADMIN — GABAPENTIN 100 MG: 100 CAPSULE ORAL at 13:40

## 2024-05-31 RX ADMIN — HYDROCHLOROTHIAZIDE 25 MG: 25 TABLET ORAL at 09:14

## 2024-05-31 RX ADMIN — SODIUM CHLORIDE, PRESERVATIVE FREE 10 ML: 5 INJECTION INTRAVENOUS at 09:14

## 2024-05-31 RX ADMIN — ATORVASTATIN CALCIUM 80 MG: 80 TABLET, FILM COATED ORAL at 20:41

## 2024-05-31 RX ADMIN — MAGNESIUM HYDROXIDE 30 ML: 400 SUSPENSION ORAL at 09:13

## 2024-05-31 RX ADMIN — IBUPROFEN 600 MG: 600 TABLET ORAL at 20:41

## 2024-05-31 RX ADMIN — AMLODIPINE BESYLATE 10 MG: 10 TABLET ORAL at 09:13

## 2024-05-31 ASSESSMENT — PAIN SCALES - GENERAL
PAINLEVEL_OUTOF10: 0
PAINLEVEL_OUTOF10: 0
PAINLEVEL_OUTOF10: 3
PAINLEVEL_OUTOF10: 6
PAINLEVEL_OUTOF10: 0
PAINLEVEL_OUTOF10: 0

## 2024-05-31 ASSESSMENT — PAIN SCALES - WONG BAKER: WONGBAKER_NUMERICALRESPONSE: NO HURT

## 2024-05-31 NOTE — DISCHARGE SUMMARY
aspirin. Patient was given loading dose Plavix 300 mg on the ER.  Patient was found to have an acute infarct in the left posterior limb of the internal capsule on MRI.  Neurology is following.  Echocardiogram was negative for shunting. Recommended to continue on plavix and statin. For her Blood pressure. Amlodipine was increased to 10 mg and HCTZ was also started which improved blood pressure. Control. Patient was also started on low dose gabapentin for peripheral neuropathy. Patient to be discharged to ARU stable.     Discharge Exam:  General Appearance: obese, alert and oriented to person, place and time and in no acute distress  Skin: warm and dry  Head: normocephalic and atraumatic  Eyes: pupils equal, round, and reactive to light, extraocular eye movements intact, conjunctivae normal  Neck: neck supple and non tender without mass   Pulmonary/Chest: clear to auscultation bilaterally- no wheezes, rales or rhonchi, normal air movement, no respiratory distress  Cardiovascular: normal rate, normal S1 and S2 and no carotid bruits  Abdomen: soft, non-tender, non-distended, normal bowel sounds, no masses or organomegaly  Extremities: no cyanosis, no clubbing and no edema  Neurologic: no cranial nerve deficit and speech normal,     I/O last 3 completed shifts:  In: 720 [P.O.:720]  Out: -   I/O this shift:  In: 240 [P.O.:240]  Out: -       LABS:  No results for input(s): \"NA\", \"K\", \"CL\", \"CO2\", \"BUN\", \"CREATININE\", \"GLUCOSE\", \"CALCIUM\" in the last 72 hours.    No results for input(s): \"WBC\", \"RBC\", \"HGB\", \"HCT\", \"MCV\", \"MCH\", \"MCHC\", \"RDW\", \"PLT\", \"MPV\" in the last 72 hours.    No results for input(s): \"POCGLU\" in the last 72 hours.    Imaging:  Echo (TTE) complete (PRN contrast/bubble/strain/3D)    Result Date: 5/28/2024    Left Ventricle: The left ventricle is normal in size with normal thickness of endocardial surface.  No regional wall motion abnormalities are identified and overall normal left ventricular systolic  streak artifact related to dental amalgam.  No evidence of acute fracture. CTA HEAD: ANTERIOR CIRCULATION: There are vascular calcifications in the carotid siphons. Fibrocalcific plaque in the left cavernous ICA results in a 50% stenosis. There are also 50% stenoses in the right cavernous ICA. Bilateral P comms are patent. The anterior and middle cerebral arteries are normal in appearance. POSTERIOR CIRCULATION: The basilar artery is small, likely related to decreased flow with near complete fetal origins of bilateral posterior cerebral arteries which are patent. OTHER: No gross dural venous sinus thrombosis. CT PERFUSION: EXAM QUALITY: The examination is diagnostic with appropriate arterial inflow and venous outflow curves, and diagnostic perfusion maps. CORE INFARCT: The total area of ischemic core is 0 mL (CBF<30% volume). TOTAL HYPOPERFUSION: The total area of hypoperfusion is 0 mL (Tmax>6s volume). PENUMBRA: The penumbra (mismatch) volume is 0 mL.     1. No acute intracranial abnormality. 2. Volume loss with chronic microvascular white matter ischemic disease that is worse. 3. No evidence of flow-limiting stenosis or dissection of the cervical carotid arteries. 4. Fibrocalcific plaque results in 50% stenoses in the cavernous ICAs bilaterally. 5. 50% stenosis in the V2 segment of the right vertebral artery.  Poor visualization of the left vertebral artery secondary to refluxed contrast into paraspinal collateral veins. 6. No areas of hypoperfusion.     CT BRAIN PERFUSION    Result Date: 5/27/2024  EXAMINATION: CTA OF THE HEAD WITH CONTRAST; CTA OF THE NECK; CT OF THE HEAD WITHOUT CONTRAST; CTA OF THE HEAD WITH CONTRAST WITH PERFUSION 5/27/2024 9:21 am TECHNIQUE: CTA of the head/brain was performed with the administration of intravenous contrast. Multiplanar reformatted images are provided for review.  MIP images are provided for review. Automated exposure control, iterative reconstruction, and/or weight based

## 2024-05-31 NOTE — CARE COORDINATION
Chart reviewed and case reviewed in IDR.  Patient with acute stroke.  Echo completed, EF 65% and negative for PFO.  Therapy updates received yesterday.  Patient for RLE US to R/O DVT today: negative for DVT.  Notified by TAMMY Grossman assistant that authorization has been received for the patient to transition to ARU today.  Spoke with Khloe, liaison with ARU at [facility] and the patient will transition to [facility].  Perfect Serve message sent to Dr Anthony Simon and notified of above with request for discharge/readmission navigator to be completed.  Bedside nurse Aleisha notified.  Anticipate patient to transition to rehab today.  Will continue to follow for further transition of care planning needs.       Francie Cuellar RN.  P:  [phone]

## 2024-05-31 NOTE — H&P
PM&R Admission History & Physical Examination    Patient: Rivka Gamboa  Age/sex: 69 y.o. female  Medical Record #: [id number]    Admission to Acute Rehab Unit: Date: 2024 diagnosis: Acute cerebrovascular accident (CVA) (HCC) [I63.9]  Admitting Physician: Ariel Tao MD  Consulting physician: Neurology  Primary care provider: Berry Khanna MD      Chief complaint:   Impairments and acitivities limitations in ADLs, mobility, functional communication, and cognition secondary to acute CVA    HPI:   Rivka Gamboa is a 69 y.o. female with past medical history significant for hypertension who presented to [facility] on 2024 with sudden onset of right hemiparesis.  She was outside the window for TNK or thrombectomy.  MRI confirmed an infarct of the left internal capsule.  She is still at a moderate assist level with most of her functioning and is felt to be a good candidate for further rehab.      Hospital course was complicated by weakness and functional deficits.     Present status: Stable  Pain: None  PO intake: Fair  BM: Not reported  Micturition: Voiding  DVT prophylaxis with Lovenox.   Weight bearing status: As tolerated    Past Medical History:   Diagnosis Date    Hypertension     Pneumonia     , treated outpt        Past Surgical History:   Procedure Laterality Date    COLONOSCOPY  3/25/14    HYSTERECTOMY (CERVIX STATUS UNKNOWN)      [facility]     TONSILLECTOMY         Family History   Problem Relation Age of Onset    Cancer Sister 40        Breast,     Cancer Brother     Asthma Son     Coronary Art Dis Brother     Coronary Art Dis Brother     Hypertension Brother     Hypertension Brother     Hypertension Brother     Hypertension Brother     Hypertension Brother     Kidney Disease Brother                Allergies   Allergen Reactions    Latex Rash    Aspirin Swelling     Tongue swelling    Pcn [Penicillins]      Unsure of reaction       Scheduled Meds:  sodium

## 2024-05-31 NOTE — PROGRESS NOTES
Treatment Note  Name: Rivka Gamboa  : 1955  MRN: [id number]      Date of Service: 2024    Evaluating PT: Yefri Malave, PT       Room #:  8516/8516-B  Diagnosis:  Acute ischemic stroke (HCC) [I63.9]  Acute right-sided weakness [R53.1]  PMHx/PSHx:   has a past medical history of Hypertension and Pneumonia.  Procedure/Surgery:  none  Precautions:  Falls, FWB status, R weakness, bed alarm  Equipment Needs:  TBD    SUBJECTIVE:    Pt lives alone in a 2nd story apartment with 6 stair(s) and 1 rail(s) to enter. No elevator access. Pt ambulated with no assistive device prior to admission. Does not currently own any DME.    OBJECTIVE:   Initial Evaluation  Date: 24 Treatment Date:  5/3/24 Short Term/ Long Term   Goals   AM-PAC 6 Clicks     Was pt agreeable to Eval/treatment? Yes Yes     Does pt have pain? No None     Bed Mobility  Rolling: Min  Supine to sit:   Min   Sit to supine: Min   Scooting: Min  Rolling min A   Supine to sit min A  Scooting min A  Rolling: Ind  Supine to sit: Ind  Sit to supine: Ind  Scooting: Ind   Transfers Sit to stand: Mod x 2 to Wheeled Walker  Stand to sit: Mod   Stand pivot: Mod x 2 with Wheeled Walker Sit to stand mod A   Stand to sit mod A  Stand pivot with ww with mod/max A  Sit to stand: Mod Ind  to AAD  Stand to sit: Ind   Stand pivot: Mod Ind  with AAD   Ambulation   20 feet with Wheeled Walker  and Mod x 2    20 feet with HHA and Mod x 2 10 feet and 20 feet with mod/max A     75 feet with Min and AAD   Stair negotiation: ascended and descended NT Nt  6 steps with Mod  1 rail    ROM BUE: Refer to OT note  BLE: WFL     Strength BUE: Refer to OT note  BLE: LLE: WFL        RLE: grossly 4-/5     Balance Sitting EOB: CGA  Dynamic Standing: Mod Ax2 with FWW  Sitting EOB: Ind  Dynamic Standing: Mod Ind with AAD         Therapeutic Exercises:  LAQS ( R LE x5)   Marching ( R LE x5 )     Patient education  Pt educated on increased R side awareness     Patient response to

## 2024-05-31 NOTE — PROGRESS NOTES
6. No areas of hypoperfusion.         CTA HEAD W CONTRAST   Final Result   1. No acute intracranial abnormality.   2. Volume loss with chronic microvascular white matter ischemic disease that   is worse.   3. No evidence of flow-limiting stenosis or dissection of the cervical   carotid arteries.   4. Fibrocalcific plaque results in 50% stenoses in the cavernous ICAs   bilaterally.   5. 50% stenosis in the V2 segment of the right vertebral artery.  Poor   visualization of the left vertebral artery secondary to refluxed contrast   into paraspinal collateral veins.   6. No areas of hypoperfusion.         CTA NECK W CONTRAST   Final Result   1. No acute intracranial abnormality.   2. Volume loss with chronic microvascular white matter ischemic disease that   is worse.   3. No evidence of flow-limiting stenosis or dissection of the cervical   carotid arteries.   4. Fibrocalcific plaque results in 50% stenoses in the cavernous ICAs   bilaterally.   5. 50% stenosis in the V2 segment of the right vertebral artery.  Poor   visualization of the left vertebral artery secondary to refluxed contrast   into paraspinal collateral veins.   6. No areas of hypoperfusion.         CT BRAIN PERFUSION   Final Result   1. No acute intracranial abnormality.   2. Volume loss with chronic microvascular white matter ischemic disease that   is worse.   3. No evidence of flow-limiting stenosis or dissection of the cervical   carotid arteries.   4. Fibrocalcific plaque results in 50% stenoses in the cavernous ICAs   bilaterally.   5. 50% stenosis in the V2 segment of the right vertebral artery.  Poor   visualization of the left vertebral artery secondary to refluxed contrast   into paraspinal collateral veins.   6. No areas of hypoperfusion.         Vascular duplex lower extremity venous right    (Results Pending)        Assessment:    Principal Problem:    Acute ischemic stroke (HCC)  Active Problems:    Acute right-sided weakness  Resolved

## 2024-05-31 NOTE — PROGRESS NOTES
Occupational Therapy  OT BEDSIDE TREATMENT NOTE   [facility]  [address], [place]       Date:2024  Patient Name: Rivka Gamboa  MRN: [id number]  : 1955  Room: [facility]     Per OT Eval:    Evaluating OT: Reggie Pink OTR/L [id number]     Referring Provider: Milanes Marino, Maria, MD                                Specific Provider Orders/Date: OT evaluation and treatment 24 1145     Diagnosis:  Acute ischemic stroke (HCC) [I63.9]  Acute right-sided weakness [R53.1]       Pertinent Medical History:  has a past medical history of Hypertension and Pneumonia.   Past Surgical History         Past Surgical History:   Procedure Laterality Date    COLONOSCOPY   3/25/14    HYSTERECTOMY (CERVIX STATUS UNKNOWN)        [facility]     TONSILLECTOMY             Pt admitted to the hospital  with R sided weakness      Orders received, chart reviewed, eval complete.      Precautions:  Fall Risk, R sided weakness, cognition, +alarms      Assessment of current deficits   [x] Functional mobility             [x]ADLs           [x] Strength                  [x]Cognition   [x] Functional transfers           [x] IADLs         [x] Safety Awareness   [x]Endurance   [x] Fine Motor Coordination    [x] Balance      [] Vision/perception    [x]Sensation     [x] Gross Motor Coordination [x] ROM          [] Delirium                  [] Motor Control      OT PLAN OF CARE   OT POC based on physician orders, patient diagnosis and results of clinical assessment     Frequency/Duration 2-4 days/wk for 2 weeks PRN   Specific OT Treatment to include:   * Instruction/training on adapted ADL techniques and AE recommendations to increase functional independence within precautions       * Training on energy conservation strategies, correct breathing pattern and techniques to improve independence/tolerance for self-care routine  * Functional

## 2024-05-31 NOTE — PROGRESS NOTES
Met with patient and family at bedside. They are aware that she will be admitting to acute rehab room 5507A today. They are aware of the visiting hours and that she will need comfortable clothes and shoes for rehab.

## 2024-06-01 LAB
ANION GAP SERPL CALCULATED.3IONS-SCNC: 17 MMOL/L (ref 7–16)
BASOPHILS # BLD: 0.04 K/UL (ref 0–0.2)
BASOPHILS NFR BLD: 1 % (ref 0–2)
BUN SERPL-MCNC: 30 MG/DL (ref 6–23)
CALCIUM SERPL-MCNC: 9.6 MG/DL (ref 8.6–10.2)
CHLORIDE SERPL-SCNC: 98 MMOL/L (ref 98–107)
CO2 SERPL-SCNC: 23 MMOL/L (ref 22–29)
CREAT SERPL-MCNC: 1.6 MG/DL (ref 0.5–1)
EOSINOPHIL # BLD: 0.19 K/UL (ref 0.05–0.5)
EOSINOPHILS RELATIVE PERCENT: 3 % (ref 0–6)
ERYTHROCYTE [DISTWIDTH] IN BLOOD BY AUTOMATED COUNT: 14.6 % (ref 11.5–15)
GFR, ESTIMATED: 35 ML/MIN/1.73M2
GLUCOSE SERPL-MCNC: 90 MG/DL (ref 74–99)
HCT VFR BLD AUTO: 48.7 % (ref 34–48)
HGB BLD-MCNC: 15.5 G/DL (ref 11.5–15.5)
IMM GRANULOCYTES # BLD AUTO: 0.05 K/UL (ref 0–0.58)
IMM GRANULOCYTES NFR BLD: 1 % (ref 0–5)
LYMPHOCYTES NFR BLD: 2.47 K/UL (ref 1.5–4)
LYMPHOCYTES RELATIVE PERCENT: 33 % (ref 20–42)
MCH RBC QN AUTO: 29.4 PG (ref 26–35)
MCHC RBC AUTO-ENTMCNC: 31.8 G/DL (ref 32–34.5)
MONOCYTES NFR BLD: 0.8 K/UL (ref 0.1–0.95)
MONOCYTES NFR BLD: 11 % (ref 2–12)
NEUTROPHILS NFR BLD: 53 % (ref 43–80)
NEUTS SEG NFR BLD: 4.03 K/UL (ref 1.8–7.3)
PLATELET # BLD AUTO: 212 K/UL (ref 130–450)
PMV BLD AUTO: 10.7 FL (ref 7–12)
POTASSIUM SERPL-SCNC: 4.3 MMOL/L (ref 3.5–5)
RBC # BLD AUTO: 5.27 M/UL (ref 3.5–5.5)
SODIUM SERPL-SCNC: 138 MMOL/L (ref 132–146)
WBC OTHER # BLD: 7.6 K/UL (ref 4.5–11.5)

## 2024-06-01 PROCEDURE — 97535 SELF CARE MNGMENT TRAINING: CPT

## 2024-06-01 PROCEDURE — 92526 ORAL FUNCTION THERAPY: CPT

## 2024-06-01 PROCEDURE — 97162 PT EVAL MOD COMPLEX 30 MIN: CPT

## 2024-06-01 PROCEDURE — 1280000000 HC REHAB R&B

## 2024-06-01 PROCEDURE — 6370000000 HC RX 637 (ALT 250 FOR IP): Performed by: PHYSICAL MEDICINE & REHABILITATION

## 2024-06-01 PROCEDURE — 80048 BASIC METABOLIC PNL TOTAL CA: CPT

## 2024-06-01 PROCEDURE — 85025 COMPLETE CBC W/AUTO DIFF WBC: CPT

## 2024-06-01 PROCEDURE — 92507 TX SP LANG VOICE COMM INDIV: CPT

## 2024-06-01 PROCEDURE — 92523 SPEECH SOUND LANG COMPREHEN: CPT

## 2024-06-01 PROCEDURE — 36415 COLL VENOUS BLD VENIPUNCTURE: CPT

## 2024-06-01 PROCEDURE — 6370000000 HC RX 637 (ALT 250 FOR IP): Performed by: INTERNAL MEDICINE

## 2024-06-01 PROCEDURE — 92610 EVALUATE SWALLOWING FUNCTION: CPT

## 2024-06-01 PROCEDURE — 97166 OT EVAL MOD COMPLEX 45 MIN: CPT

## 2024-06-01 PROCEDURE — 97530 THERAPEUTIC ACTIVITIES: CPT

## 2024-06-01 PROCEDURE — 6360000002 HC RX W HCPCS: Performed by: INTERNAL MEDICINE

## 2024-06-01 RX ORDER — FAMOTIDINE 20 MG/1
20 TABLET, FILM COATED ORAL DAILY
Status: DISCONTINUED | OUTPATIENT
Start: 2024-06-01 | End: 2024-06-12 | Stop reason: HOSPADM

## 2024-06-01 RX ADMIN — ATORVASTATIN CALCIUM 80 MG: 80 TABLET, FILM COATED ORAL at 21:31

## 2024-06-01 RX ADMIN — GABAPENTIN 100 MG: 100 CAPSULE ORAL at 14:06

## 2024-06-01 RX ADMIN — HYDROCHLOROTHIAZIDE 25 MG: 25 TABLET ORAL at 08:29

## 2024-06-01 RX ADMIN — IBUPROFEN 600 MG: 600 TABLET ORAL at 11:11

## 2024-06-01 RX ADMIN — CLOPIDOGREL BISULFATE 75 MG: 75 TABLET ORAL at 08:29

## 2024-06-01 RX ADMIN — GABAPENTIN 100 MG: 100 CAPSULE ORAL at 08:29

## 2024-06-01 RX ADMIN — ENOXAPARIN SODIUM 40 MG: 100 INJECTION SUBCUTANEOUS at 08:38

## 2024-06-01 RX ADMIN — AMLODIPINE BESYLATE 10 MG: 10 TABLET ORAL at 08:29

## 2024-06-01 RX ADMIN — FAMOTIDINE 20 MG: 20 TABLET ORAL at 09:28

## 2024-06-01 RX ADMIN — GABAPENTIN 100 MG: 100 CAPSULE ORAL at 21:31

## 2024-06-01 ASSESSMENT — 9 HOLE PEG TEST
TESTTIME_SECONDS: 90
TESTTIME_SECONDS: 56
TEST_RESULT: IMPAIRED
TEST_RESULT: IMPAIRED

## 2024-06-01 ASSESSMENT — PAIN DESCRIPTION - ORIENTATION: ORIENTATION: RIGHT;LEFT;LOWER;MID

## 2024-06-01 ASSESSMENT — PAIN DESCRIPTION - LOCATION: LOCATION: LEG;BACK

## 2024-06-01 ASSESSMENT — PAIN SCALES - GENERAL
PAINLEVEL_OUTOF10: 0
PAINLEVEL_OUTOF10: 6
PAINLEVEL_OUTOF10: 7

## 2024-06-01 ASSESSMENT — PAIN - FUNCTIONAL ASSESSMENT: PAIN_FUNCTIONAL_ASSESSMENT: ACTIVITIES ARE NOT PREVENTED

## 2024-06-01 NOTE — CARE COORDINATION
Acute Rehab Social Work Assessment     PCP: Dr. Berry Khanna but would like to get a new PCP.    Patient Resides: Patient is single and lives alone. No pets.    Home Architecture : Patient lives in in an apartment on the second floor. 6 steps up with 1 rail. Has no access to elevator. Bedroom and bathroom on first floor. Bathroom is a tub shower combo with curtains.     Will you return to your own home? Yes    Primary Caregivers: Patient has 7 children and would like two children to be updated. Daughter Su Gamboa (50's) is healthy and drives and does not work. Daughter Lanny Palacios (40) works with mother at [employer] as a Home Health Aid. Son David (37), Son Jesús (44), Satnikhil (40), Shanthi 954), and Agustina (51).       Level of Function PTA : Patient was independent prior to admission. Patients children drive her because she does not have a car.     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     DME Pta  : None    Community Agency Involvement PTA : None    Family Teaching: To be scheduled     NAME RELATION PRIMARY # ADDITIONAL #    Su Gamboa  Daughter [phone]     Lanny Palacios  Daughter  [phone]              Height: 5'7  Weight: 184  SSN#:     CURTIS Barrow Intern   JERO Bonds

## 2024-06-01 NOTE — PLAN OF CARE
Problem: Discharge Planning  Goal: Discharge to home or other facility with appropriate resources  5/31/2024 2218 by Emiliana Rome, RN  Outcome: Progressing  Flowsheets (Taken 5/31/2024 2030)  Discharge to home or other facility with appropriate resources: Identify barriers to discharge with patient and caregiver  5/31/2024 1644 by Cooper Zarate, RN  Outcome: Progressing     Problem: Safety - Adult  Goal: Free from fall injury  5/31/2024 2218 by Emiliana Rome, RN  Outcome: Progressing  5/31/2024 1644 by Cooper Zarate, RN  Outcome: Progressing

## 2024-06-02 PROCEDURE — 1280000000 HC REHAB R&B

## 2024-06-02 PROCEDURE — 6360000002 HC RX W HCPCS: Performed by: INTERNAL MEDICINE

## 2024-06-02 PROCEDURE — 6370000000 HC RX 637 (ALT 250 FOR IP): Performed by: PHYSICAL MEDICINE & REHABILITATION

## 2024-06-02 PROCEDURE — 6370000000 HC RX 637 (ALT 250 FOR IP): Performed by: INTERNAL MEDICINE

## 2024-06-02 PROCEDURE — 97530 THERAPEUTIC ACTIVITIES: CPT

## 2024-06-02 RX ADMIN — HYDROCHLOROTHIAZIDE 25 MG: 25 TABLET ORAL at 07:56

## 2024-06-02 RX ADMIN — AMLODIPINE BESYLATE 10 MG: 10 TABLET ORAL at 07:56

## 2024-06-02 RX ADMIN — IBUPROFEN 600 MG: 600 TABLET ORAL at 05:46

## 2024-06-02 RX ADMIN — ATORVASTATIN CALCIUM 80 MG: 80 TABLET, FILM COATED ORAL at 21:27

## 2024-06-02 RX ADMIN — GABAPENTIN 100 MG: 100 CAPSULE ORAL at 07:56

## 2024-06-02 RX ADMIN — GABAPENTIN 100 MG: 100 CAPSULE ORAL at 14:00

## 2024-06-02 RX ADMIN — ENOXAPARIN SODIUM 40 MG: 100 INJECTION SUBCUTANEOUS at 07:56

## 2024-06-02 RX ADMIN — FAMOTIDINE 20 MG: 20 TABLET ORAL at 07:57

## 2024-06-02 RX ADMIN — ACETAMINOPHEN 650 MG: 325 TABLET ORAL at 11:18

## 2024-06-02 RX ADMIN — GABAPENTIN 100 MG: 100 CAPSULE ORAL at 21:27

## 2024-06-02 RX ADMIN — MAGNESIUM HYDROXIDE 30 ML: 400 SUSPENSION ORAL at 21:29

## 2024-06-02 RX ADMIN — CLOPIDOGREL BISULFATE 75 MG: 75 TABLET ORAL at 07:57

## 2024-06-02 ASSESSMENT — PAIN DESCRIPTION - ORIENTATION: ORIENTATION: LEFT

## 2024-06-02 ASSESSMENT — PAIN DESCRIPTION - LOCATION
LOCATION: VAGINA
LOCATION: VAGINA
LOCATION: GENERALIZED

## 2024-06-02 ASSESSMENT — PAIN SCALES - GENERAL
PAINLEVEL_OUTOF10: 0
PAINLEVEL_OUTOF10: 10
PAINLEVEL_OUTOF10: 7
PAINLEVEL_OUTOF10: 9
PAINLEVEL_OUTOF10: 6
PAINLEVEL_OUTOF10: 8

## 2024-06-02 ASSESSMENT — PAIN DESCRIPTION - DESCRIPTORS
DESCRIPTORS: ACHING;BURNING;STABBING
DESCRIPTORS: ACHING;BURNING
DESCRIPTORS: ACHING;DISCOMFORT;SORE

## 2024-06-02 ASSESSMENT — PAIN SCALES - WONG BAKER: WONGBAKER_NUMERICALRESPONSE: NO HURT

## 2024-06-02 NOTE — PLAN OF CARE
Problem: Discharge Planning  Goal: Discharge to home or other facility with appropriate resources  6/2/2024 1100 by Bridgette Grewal LPN  Outcome: Progressing  6/2/2024 0304 by Lori Renteria RN  Outcome: Progressing     Problem: Safety - Adult  Goal: Free from fall injury  6/2/2024 1100 by Bridgette Grewal LPN  Outcome: Progressing  6/2/2024 0304 by Lori Renteria RN  Outcome: Progressing     Problem: Pain  Goal: Verbalizes/displays adequate comfort level or baseline comfort level  Outcome: Progressing

## 2024-06-02 NOTE — FLOWSHEET NOTE
On return from voiding in the BR, pt c/o vaginal pain giving it a # 12 for pain. Pt seems irritable but agreed to try MOTRIN. States she had this same pain at home but it had gone away on it's own and now has returned. Pt has no vaginal bleeding or drainage.

## 2024-06-03 PROCEDURE — 97129 THER IVNTJ 1ST 15 MIN: CPT

## 2024-06-03 PROCEDURE — 6370000000 HC RX 637 (ALT 250 FOR IP): Performed by: INTERNAL MEDICINE

## 2024-06-03 PROCEDURE — 97530 THERAPEUTIC ACTIVITIES: CPT

## 2024-06-03 PROCEDURE — 1280000000 HC REHAB R&B

## 2024-06-03 PROCEDURE — 97130 THER IVNTJ EA ADDL 15 MIN: CPT

## 2024-06-03 PROCEDURE — 97116 GAIT TRAINING THERAPY: CPT

## 2024-06-03 PROCEDURE — 6370000000 HC RX 637 (ALT 250 FOR IP): Performed by: PHYSICAL MEDICINE & REHABILITATION

## 2024-06-03 PROCEDURE — 97110 THERAPEUTIC EXERCISES: CPT

## 2024-06-03 PROCEDURE — 6360000002 HC RX W HCPCS: Performed by: INTERNAL MEDICINE

## 2024-06-03 PROCEDURE — 97535 SELF CARE MNGMENT TRAINING: CPT

## 2024-06-03 RX ADMIN — ENOXAPARIN SODIUM 40 MG: 100 INJECTION SUBCUTANEOUS at 09:02

## 2024-06-03 RX ADMIN — GABAPENTIN 100 MG: 100 CAPSULE ORAL at 09:02

## 2024-06-03 RX ADMIN — AMLODIPINE BESYLATE 10 MG: 10 TABLET ORAL at 09:02

## 2024-06-03 RX ADMIN — ATORVASTATIN CALCIUM 80 MG: 80 TABLET, FILM COATED ORAL at 21:19

## 2024-06-03 RX ADMIN — IBUPROFEN 600 MG: 600 TABLET ORAL at 21:21

## 2024-06-03 RX ADMIN — CLOPIDOGREL BISULFATE 75 MG: 75 TABLET ORAL at 09:02

## 2024-06-03 RX ADMIN — FAMOTIDINE 20 MG: 20 TABLET ORAL at 09:02

## 2024-06-03 RX ADMIN — GABAPENTIN 100 MG: 100 CAPSULE ORAL at 14:07

## 2024-06-03 RX ADMIN — HYDROCHLOROTHIAZIDE 25 MG: 25 TABLET ORAL at 09:02

## 2024-06-03 RX ADMIN — GABAPENTIN 100 MG: 100 CAPSULE ORAL at 21:19

## 2024-06-03 ASSESSMENT — PAIN - FUNCTIONAL ASSESSMENT: PAIN_FUNCTIONAL_ASSESSMENT: ACTIVITIES ARE NOT PREVENTED

## 2024-06-03 ASSESSMENT — PAIN SCALES - WONG BAKER: WONGBAKER_NUMERICALRESPONSE: NO HURT

## 2024-06-03 ASSESSMENT — PAIN DESCRIPTION - DESCRIPTORS: DESCRIPTORS: ACHING;SHARP

## 2024-06-03 ASSESSMENT — PAIN DESCRIPTION - LOCATION: LOCATION: KNEE

## 2024-06-03 ASSESSMENT — PAIN SCALES - GENERAL: PAINLEVEL_OUTOF10: 8

## 2024-06-03 NOTE — PLAN OF CARE
Problem: Discharge Planning  Goal: Discharge to home or other facility with appropriate resources  6/3/2024 1050 by Cooper Zarate RN  Outcome: Progressing  6/2/2024 2250 by Lori Renteria RN  Outcome: Progressing     Problem: Safety - Adult  Goal: Free from fall injury  6/3/2024 1050 by Cooper Zarate RN  Outcome: Progressing  6/2/2024 2250 by Lori Renteria RN  Outcome: Progressing     Problem: Pain  Goal: Verbalizes/displays adequate comfort level or baseline comfort level  6/3/2024 1050 by Cooper Zarate RN  Outcome: Progressing  6/2/2024 2250 by Lori Renteria RN  Outcome: Progressing

## 2024-06-04 PROCEDURE — 6370000000 HC RX 637 (ALT 250 FOR IP): Performed by: PHYSICAL MEDICINE & REHABILITATION

## 2024-06-04 PROCEDURE — 92526 ORAL FUNCTION THERAPY: CPT

## 2024-06-04 PROCEDURE — 6360000002 HC RX W HCPCS: Performed by: INTERNAL MEDICINE

## 2024-06-04 PROCEDURE — 6370000000 HC RX 637 (ALT 250 FOR IP): Performed by: INTERNAL MEDICINE

## 2024-06-04 PROCEDURE — 97130 THER IVNTJ EA ADDL 15 MIN: CPT

## 2024-06-04 PROCEDURE — 97530 THERAPEUTIC ACTIVITIES: CPT

## 2024-06-04 PROCEDURE — 97129 THER IVNTJ 1ST 15 MIN: CPT

## 2024-06-04 PROCEDURE — 1280000000 HC REHAB R&B

## 2024-06-04 PROCEDURE — 97535 SELF CARE MNGMENT TRAINING: CPT

## 2024-06-04 PROCEDURE — 97110 THERAPEUTIC EXERCISES: CPT

## 2024-06-04 RX ADMIN — ATORVASTATIN CALCIUM 80 MG: 80 TABLET, FILM COATED ORAL at 20:20

## 2024-06-04 RX ADMIN — GABAPENTIN 100 MG: 100 CAPSULE ORAL at 20:20

## 2024-06-04 RX ADMIN — AMLODIPINE BESYLATE 10 MG: 10 TABLET ORAL at 08:29

## 2024-06-04 RX ADMIN — GABAPENTIN 100 MG: 100 CAPSULE ORAL at 08:29

## 2024-06-04 RX ADMIN — DICLOFENAC SODIUM 2 G: 10 GEL TOPICAL at 20:21

## 2024-06-04 RX ADMIN — HYDROCHLOROTHIAZIDE 25 MG: 25 TABLET ORAL at 08:29

## 2024-06-04 RX ADMIN — CLOPIDOGREL BISULFATE 75 MG: 75 TABLET ORAL at 08:29

## 2024-06-04 RX ADMIN — ENOXAPARIN SODIUM 40 MG: 100 INJECTION SUBCUTANEOUS at 08:28

## 2024-06-04 RX ADMIN — DICLOFENAC SODIUM 2 G: 10 GEL TOPICAL at 10:04

## 2024-06-04 RX ADMIN — FAMOTIDINE 20 MG: 20 TABLET ORAL at 08:29

## 2024-06-04 RX ADMIN — GABAPENTIN 100 MG: 100 CAPSULE ORAL at 14:31

## 2024-06-04 NOTE — PLAN OF CARE
Problem: Discharge Planning  Goal: Discharge to home or other facility with appropriate resources  6/4/2024 1049 by Cooper Zarate RN  Outcome: Progressing  6/4/2024 0100 by Flora Mims RN  Outcome: Progressing     Problem: Safety - Adult  Goal: Free from fall injury  6/4/2024 1049 by Cooper Zarate RN  Outcome: Progressing  6/4/2024 0100 by Flora Mims RN  Outcome: Progressing     Problem: Pain  Goal: Verbalizes/displays adequate comfort level or baseline comfort level  6/4/2024 1049 by Cooper Zarate RN  Outcome: Progressing  6/4/2024 0100 by Flora Mims RN  Outcome: Progressing

## 2024-06-04 NOTE — CARE COORDINATION
Per team meeting:  D/c 6/13.  Updated patient .  Attempted to call daughter Su no availability to leave a message, left message for daughter Lanny.  LTG's are for Mod I to I except sup for bathing.  Patient would benefit from a wheeled walker and toe - off - patient refuses. Trial regular diet today.  R side neglect.    DME - patient is refusing wheeled walker and 3 in 1 commode.  She states she may consider ETB.  SW educated her on safety issues and therapy recommendations as to why.    Home RN/PT/OT/SP/aide - referral to [facility] - will need to schedule.    The Plan for Transition of Care is related to the following treatment goals: home     The Patient  was provided with a choice of provider and agrees   with the discharge plan. [x] Yes [] No    Freedom of choice list was provided with basic dialogue that supports the patient's individualized plan of care/goals, treatment preferences and shares the quality data associated with the providers. [x] Yes [] No    JERO Bonds

## 2024-06-04 NOTE — PATIENT CARE CONFERENCE
[facility]  INPATIENT ACUTE REHABILITATION  TEAM CONFERENCE NOTE/PATIENT PLAN OF CARE    The physician was present and led this team conference    Date: 2024  Admission date: 2024  Patient Name: Rivka Gamboa        MRN: [id number]    : 1955  (69 y.o.)  Gender: female   Rehab diagnosis/surgery with date:  CVA of 24  Impairment Group Code:  1.2      MEDICAL/FUNCTIONAL HISTORY/STATUS:  right side weakness, history of hypertension and hyperlipidemia    Consultations/Labs/X-rays:       Latest Reference Range & Units 24 06:00   Sodium 132 - 146 mmol/L 138   Potassium 3.5 - 5.0 mmol/L 4.3   Chloride 98 - 107 mmol/L 98   CARBON DIOXIDE 22 - 29 mmol/L 23   BUN,BUNPL 6 - 23 mg/dL 30 (H)   Creatinine 0.50 - 1.00 mg/dL 1.6 (H)      Latest Reference Range & Units 24 06:00   WBC 4.5 - 11.5 k/uL 7.6   RBC 3.50 - 5.50 m/uL 5.27   Hemoglobin Quant 11.5 - 15.5 g/dL 15.5   Hematocrit 34.0 - 48.0 % 48.7 (H)     MEDICATION UPDATE:  Voltaren gel for osteoarthritic knee pain    NURSING :    Bowel:   Always Continent  [x]   Occasionally incontinent  []   Frequently incontinent  []   Always incontinent  []   Not occurred  []     Bladder:   Always Continent  [x]    Incontinent less than daily[]   Incontinent  daily []   Always incontinent  []   No urine output    []   Indwelling catheter []       Toilet Hygiene:   Current level : mod assist  Short term Toilet hygiene goal: min assist.  Long term toilet hygiene goal:  Modified independent.    Skin integrity: intact  Pain: left knee, motrin effective    NUTRITION    Diet  soft ,bite sized  Liquid consistency   thin    SOCIAL INFORMATION:  Lives with: alone  Prior community services:  none  Home Architecture:  2nd floor apt , 6 entry steps with 1 rail, all 1 level once inside  Prior Level of function:  independent, did not drive  DME:  none    FAMILY / PATIENT EDUCATION:  safety and self care are ongoing with patient    PHYSICAL THERAPY    Bed

## 2024-06-05 PROCEDURE — 92526 ORAL FUNCTION THERAPY: CPT

## 2024-06-05 PROCEDURE — 1280000000 HC REHAB R&B

## 2024-06-05 PROCEDURE — 97116 GAIT TRAINING THERAPY: CPT

## 2024-06-05 PROCEDURE — 6370000000 HC RX 637 (ALT 250 FOR IP): Performed by: INTERNAL MEDICINE

## 2024-06-05 PROCEDURE — 97130 THER IVNTJ EA ADDL 15 MIN: CPT

## 2024-06-05 PROCEDURE — 6360000002 HC RX W HCPCS: Performed by: INTERNAL MEDICINE

## 2024-06-05 PROCEDURE — 97110 THERAPEUTIC EXERCISES: CPT

## 2024-06-05 PROCEDURE — 6370000000 HC RX 637 (ALT 250 FOR IP): Performed by: PHYSICAL MEDICINE & REHABILITATION

## 2024-06-05 PROCEDURE — 97535 SELF CARE MNGMENT TRAINING: CPT

## 2024-06-05 PROCEDURE — 97129 THER IVNTJ 1ST 15 MIN: CPT

## 2024-06-05 PROCEDURE — 97530 THERAPEUTIC ACTIVITIES: CPT

## 2024-06-05 RX ADMIN — CLOPIDOGREL BISULFATE 75 MG: 75 TABLET ORAL at 08:00

## 2024-06-05 RX ADMIN — AMLODIPINE BESYLATE 10 MG: 10 TABLET ORAL at 08:00

## 2024-06-05 RX ADMIN — GABAPENTIN 100 MG: 100 CAPSULE ORAL at 14:33

## 2024-06-05 RX ADMIN — ATORVASTATIN CALCIUM 80 MG: 80 TABLET, FILM COATED ORAL at 21:52

## 2024-06-05 RX ADMIN — IBUPROFEN 600 MG: 600 TABLET ORAL at 21:51

## 2024-06-05 RX ADMIN — ENOXAPARIN SODIUM 40 MG: 100 INJECTION SUBCUTANEOUS at 07:59

## 2024-06-05 RX ADMIN — DICLOFENAC SODIUM 2 G: 10 GEL TOPICAL at 08:01

## 2024-06-05 RX ADMIN — HYDROCHLOROTHIAZIDE 25 MG: 25 TABLET ORAL at 08:00

## 2024-06-05 RX ADMIN — FAMOTIDINE 20 MG: 20 TABLET ORAL at 08:00

## 2024-06-05 RX ADMIN — DICLOFENAC SODIUM 2 G: 10 GEL TOPICAL at 21:51

## 2024-06-05 RX ADMIN — GABAPENTIN 100 MG: 100 CAPSULE ORAL at 21:51

## 2024-06-05 RX ADMIN — GABAPENTIN 100 MG: 100 CAPSULE ORAL at 08:00

## 2024-06-05 ASSESSMENT — PAIN DESCRIPTION - DESCRIPTORS
DESCRIPTORS: ACHING;SORE;DISCOMFORT
DESCRIPTORS: ACHING;DISCOMFORT;SORE

## 2024-06-05 ASSESSMENT — PAIN DESCRIPTION - LOCATION
LOCATION: GENERALIZED
LOCATION: KNEE

## 2024-06-05 ASSESSMENT — PAIN DESCRIPTION - ORIENTATION: ORIENTATION: RIGHT

## 2024-06-05 ASSESSMENT — PAIN SCALES - GENERAL
PAINLEVEL_OUTOF10: 4
PAINLEVEL_OUTOF10: 10

## 2024-06-05 ASSESSMENT — PAIN - FUNCTIONAL ASSESSMENT: PAIN_FUNCTIONAL_ASSESSMENT: ACTIVITIES ARE NOT PREVENTED

## 2024-06-05 NOTE — PLAN OF CARE
Problem: Discharge Planning  Goal: Discharge to home or other facility with appropriate resources  6/5/2024 0013 by Flora Mims RN  Outcome: Progressing  6/4/2024 1049 by Cooper Zarate RN  Outcome: Progressing     Problem: Safety - Adult  Goal: Free from fall injury  6/5/2024 0013 by Flora Mims RN  Outcome: Progressing  Flowsheets (Taken 6/4/2024 2321 by Teresa Bull LPN)  Free From Fall Injury: Instruct family/caregiver on patient safety  6/4/2024 1049 by Cooper Zarate RN  Outcome: Progressing     Problem: Pain  Goal: Verbalizes/displays adequate comfort level or baseline comfort level  6/5/2024 0013 by Flora Mims RN  Outcome: Progressing  6/4/2024 1049 by Cooper Zarate RN  Outcome: Progressing

## 2024-06-05 NOTE — PLAN OF CARE
Problem: Discharge Planning  Goal: Discharge to home or other facility with appropriate resources  6/5/2024 0942 by Bridgette Grewal LPN  Outcome: Progressing  6/5/2024 0013 by Flora Mims RN  Outcome: Progressing     Problem: Safety - Adult  Goal: Free from fall injury  6/5/2024 0942 by Bridgette Grewal LPN  Outcome: Progressing  6/5/2024 0013 by Flora Mims RN  Outcome: Progressing  Flowsheets (Taken 6/4/2024 2321 by Teresa Bull LPN)  Free From Fall Injury: Instruct family/caregiver on patient safety     Problem: Pain  Goal: Verbalizes/displays adequate comfort level or baseline comfort level  6/5/2024 0942 by Bridgette Grewal LPN  Outcome: Progressing  6/5/2024 0013 by Flora Mims RN  Outcome: Progressing

## 2024-06-05 NOTE — PLAN OF CARE
Problem: Discharge Planning  Goal: Discharge to home or other facility with appropriate resources  6/5/2024 1018 by Cooper Zarate RN  Outcome: Progressing  6/5/2024 0942 by Bridgette Grewal LPN  Outcome: Progressing  6/5/2024 0013 by Flora Mims RN  Outcome: Progressing     Problem: Safety - Adult  Goal: Free from fall injury  6/5/2024 1018 by Cooper Zarate RN  Outcome: Progressing  6/5/2024 0942 by Bridgette Grewal LPN  Outcome: Progressing  6/5/2024 0013 by Flora Mims RN  Outcome: Progressing  Flowsheets (Taken 6/4/2024 2321 by Teresa Bull LPN)  Free From Fall Injury: Instruct family/caregiver on patient safety     Problem: Pain  Goal: Verbalizes/displays adequate comfort level or baseline comfort level  6/5/2024 1018 by Cooper Zarate RN  Outcome: Progressing  6/5/2024 0942 by Bridgette Grewal LPN  Outcome: Progressing  6/5/2024 0013 by Flora Mims RN  Outcome: Progressing

## 2024-06-06 PROCEDURE — 1280000000 HC REHAB R&B

## 2024-06-06 PROCEDURE — 97530 THERAPEUTIC ACTIVITIES: CPT

## 2024-06-06 PROCEDURE — 6370000000 HC RX 637 (ALT 250 FOR IP): Performed by: INTERNAL MEDICINE

## 2024-06-06 PROCEDURE — 97130 THER IVNTJ EA ADDL 15 MIN: CPT | Performed by: SPEECH-LANGUAGE PATHOLOGIST

## 2024-06-06 PROCEDURE — 97110 THERAPEUTIC EXERCISES: CPT

## 2024-06-06 PROCEDURE — 92526 ORAL FUNCTION THERAPY: CPT

## 2024-06-06 PROCEDURE — 97129 THER IVNTJ 1ST 15 MIN: CPT | Performed by: SPEECH-LANGUAGE PATHOLOGIST

## 2024-06-06 PROCEDURE — 6370000000 HC RX 637 (ALT 250 FOR IP): Performed by: PHYSICAL MEDICINE & REHABILITATION

## 2024-06-06 PROCEDURE — 6360000002 HC RX W HCPCS: Performed by: INTERNAL MEDICINE

## 2024-06-06 RX ADMIN — GABAPENTIN 100 MG: 100 CAPSULE ORAL at 08:12

## 2024-06-06 RX ADMIN — FAMOTIDINE 20 MG: 20 TABLET ORAL at 08:12

## 2024-06-06 RX ADMIN — DICLOFENAC SODIUM 2 G: 10 GEL TOPICAL at 20:39

## 2024-06-06 RX ADMIN — GABAPENTIN 100 MG: 100 CAPSULE ORAL at 20:39

## 2024-06-06 RX ADMIN — IBUPROFEN 600 MG: 600 TABLET ORAL at 10:01

## 2024-06-06 RX ADMIN — DICLOFENAC SODIUM 2 G: 10 GEL TOPICAL at 08:12

## 2024-06-06 RX ADMIN — ATORVASTATIN CALCIUM 80 MG: 80 TABLET, FILM COATED ORAL at 20:39

## 2024-06-06 RX ADMIN — HYDROCHLOROTHIAZIDE 25 MG: 25 TABLET ORAL at 08:12

## 2024-06-06 RX ADMIN — ENOXAPARIN SODIUM 40 MG: 100 INJECTION SUBCUTANEOUS at 08:12

## 2024-06-06 RX ADMIN — GABAPENTIN 100 MG: 100 CAPSULE ORAL at 13:53

## 2024-06-06 RX ADMIN — CLOPIDOGREL BISULFATE 75 MG: 75 TABLET ORAL at 08:12

## 2024-06-06 RX ADMIN — AMLODIPINE BESYLATE 10 MG: 10 TABLET ORAL at 08:12

## 2024-06-06 ASSESSMENT — PAIN SCALES - GENERAL
PAINLEVEL_OUTOF10: 2
PAINLEVEL_OUTOF10: 3
PAINLEVEL_OUTOF10: 3
PAINLEVEL_OUTOF10: 5

## 2024-06-06 ASSESSMENT — PAIN DESCRIPTION - LOCATION
LOCATION: FOOT
LOCATION: KNEE
LOCATION: KNEE

## 2024-06-06 ASSESSMENT — PAIN DESCRIPTION - ORIENTATION
ORIENTATION: LEFT;RIGHT
ORIENTATION: RIGHT;LEFT
ORIENTATION: RIGHT;LEFT

## 2024-06-06 ASSESSMENT — PAIN SCALES - WONG BAKER
WONGBAKER_NUMERICALRESPONSE: HURTS A LITTLE BIT
WONGBAKER_NUMERICALRESPONSE: NO HURT

## 2024-06-06 ASSESSMENT — PAIN DESCRIPTION - DESCRIPTORS: DESCRIPTORS: ACHING;DISCOMFORT;SORE

## 2024-06-06 NOTE — PLAN OF CARE
Problem: Discharge Planning  Goal: Discharge to home or other facility with appropriate resources  6/5/2024 2302 by Hodan Rojas RN  Outcome: Progressing  6/5/2024 1018 by Cooper Zarate RN  Outcome: Progressing  6/5/2024 0942 by Bridgette Grewal LPN  Outcome: Progressing     Problem: Safety - Adult  Goal: Free from fall injury  6/5/2024 2302 by Hodan Rojas RN  Outcome: Progressing  Flowsheets (Taken 6/5/2024 2150)  Free From Fall Injury: Instruct family/caregiver on patient safety  6/5/2024 1018 by Cooper Zarate RN  Outcome: Progressing  6/5/2024 0942 by Bridgette Grewal LPN  Outcome: Progressing     Problem: Pain  Goal: Verbalizes/displays adequate comfort level or baseline comfort level  6/5/2024 2302 by Hodan Rojas RN  Outcome: Progressing  6/5/2024 1018 by Cooper Zarate RN  Outcome: Progressing  6/5/2024 0942 by Bridgette Grewal LPN  Outcome: Progressing

## 2024-06-06 NOTE — PLAN OF CARE
Problem: Discharge Planning  Goal: Discharge to home or other facility with appropriate resources  6/6/2024 1151 by Jimi Tolentino RN  Outcome: Progressing  6/5/2024 2302 by Hodan Rojas RN  Outcome: Progressing     Problem: Safety - Adult  Goal: Free from fall injury  6/6/2024 1151 by Jimi Tolentino RN  Outcome: Progressing  6/5/2024 2302 by Hodan Rojas RN  Outcome: Progressing  Flowsheets (Taken 6/5/2024 2150)  Free From Fall Injury: Instruct family/caregiver on patient safety     Problem: Pain  Goal: Verbalizes/displays adequate comfort level or baseline comfort level  6/6/2024 1151 by Jimi Tolentino RN  Outcome: Progressing  6/5/2024 2302 by Hodan Rojas RN  Outcome: Progressing

## 2024-06-07 PROCEDURE — 97530 THERAPEUTIC ACTIVITIES: CPT

## 2024-06-07 PROCEDURE — 92526 ORAL FUNCTION THERAPY: CPT

## 2024-06-07 PROCEDURE — 97130 THER IVNTJ EA ADDL 15 MIN: CPT

## 2024-06-07 PROCEDURE — 1280000000 HC REHAB R&B

## 2024-06-07 PROCEDURE — 97535 SELF CARE MNGMENT TRAINING: CPT

## 2024-06-07 PROCEDURE — 6370000000 HC RX 637 (ALT 250 FOR IP): Performed by: INTERNAL MEDICINE

## 2024-06-07 PROCEDURE — 6370000000 HC RX 637 (ALT 250 FOR IP): Performed by: PHYSICAL MEDICINE & REHABILITATION

## 2024-06-07 PROCEDURE — 6360000002 HC RX W HCPCS: Performed by: INTERNAL MEDICINE

## 2024-06-07 PROCEDURE — 97129 THER IVNTJ 1ST 15 MIN: CPT

## 2024-06-07 RX ADMIN — GABAPENTIN 100 MG: 100 CAPSULE ORAL at 21:38

## 2024-06-07 RX ADMIN — CLOPIDOGREL BISULFATE 75 MG: 75 TABLET ORAL at 08:30

## 2024-06-07 RX ADMIN — DICLOFENAC SODIUM 2 G: 10 GEL TOPICAL at 08:35

## 2024-06-07 RX ADMIN — ENOXAPARIN SODIUM 40 MG: 100 INJECTION SUBCUTANEOUS at 08:29

## 2024-06-07 RX ADMIN — HYDROCHLOROTHIAZIDE 25 MG: 25 TABLET ORAL at 08:30

## 2024-06-07 RX ADMIN — METOPROLOL TARTRATE 25 MG: 25 TABLET, FILM COATED ORAL at 08:37

## 2024-06-07 RX ADMIN — DICLOFENAC SODIUM 2 G: 10 GEL TOPICAL at 21:38

## 2024-06-07 RX ADMIN — GABAPENTIN 100 MG: 100 CAPSULE ORAL at 08:30

## 2024-06-07 RX ADMIN — GABAPENTIN 100 MG: 100 CAPSULE ORAL at 13:51

## 2024-06-07 RX ADMIN — FAMOTIDINE 20 MG: 20 TABLET ORAL at 08:30

## 2024-06-07 RX ADMIN — METOPROLOL TARTRATE 25 MG: 25 TABLET, FILM COATED ORAL at 21:38

## 2024-06-07 RX ADMIN — AMLODIPINE BESYLATE 10 MG: 10 TABLET ORAL at 08:30

## 2024-06-07 RX ADMIN — ATORVASTATIN CALCIUM 80 MG: 80 TABLET, FILM COATED ORAL at 21:38

## 2024-06-07 ASSESSMENT — PAIN SCALES - GENERAL
PAINLEVEL_OUTOF10: 9
PAINLEVEL_OUTOF10: 2

## 2024-06-07 ASSESSMENT — PAIN DESCRIPTION - LOCATION: LOCATION: WRIST

## 2024-06-07 ASSESSMENT — PAIN DESCRIPTION - ORIENTATION: ORIENTATION: LEFT

## 2024-06-07 NOTE — PLAN OF CARE
Problem: Discharge Planning  Goal: Discharge to home or other facility with appropriate resources  6/6/2024 2212 by Hodan Rojas RN  Outcome: Progressing  6/6/2024 1151 by Jimi Tolentino RN  Outcome: Progressing     Problem: Safety - Adult  Goal: Free from fall injury  6/6/2024 2212 by Hodan Rojas RN  Outcome: Progressing  Flowsheets (Taken 6/6/2024 2030)  Free From Fall Injury: Instruct family/caregiver on patient safety  6/6/2024 1151 by Jimi Tolentino RN  Outcome: Progressing     Problem: Pain  Goal: Verbalizes/displays adequate comfort level or baseline comfort level  6/6/2024 2212 by Hodan Rojas RN  Outcome: Progressing  6/6/2024 1151 by Jimi Tolentino RN  Outcome: Progressing

## 2024-06-08 PROCEDURE — 97130 THER IVNTJ EA ADDL 15 MIN: CPT

## 2024-06-08 PROCEDURE — 97530 THERAPEUTIC ACTIVITIES: CPT

## 2024-06-08 PROCEDURE — 97535 SELF CARE MNGMENT TRAINING: CPT

## 2024-06-08 PROCEDURE — 6370000000 HC RX 637 (ALT 250 FOR IP): Performed by: PHYSICAL MEDICINE & REHABILITATION

## 2024-06-08 PROCEDURE — 6360000002 HC RX W HCPCS: Performed by: INTERNAL MEDICINE

## 2024-06-08 PROCEDURE — 97129 THER IVNTJ 1ST 15 MIN: CPT

## 2024-06-08 PROCEDURE — 1280000000 HC REHAB R&B

## 2024-06-08 PROCEDURE — 6370000000 HC RX 637 (ALT 250 FOR IP): Performed by: INTERNAL MEDICINE

## 2024-06-08 RX ADMIN — GABAPENTIN 100 MG: 100 CAPSULE ORAL at 21:22

## 2024-06-08 RX ADMIN — DICLOFENAC SODIUM 2 G: 10 GEL TOPICAL at 21:22

## 2024-06-08 RX ADMIN — ENOXAPARIN SODIUM 40 MG: 100 INJECTION SUBCUTANEOUS at 08:12

## 2024-06-08 RX ADMIN — AMLODIPINE BESYLATE 10 MG: 10 TABLET ORAL at 08:12

## 2024-06-08 RX ADMIN — GABAPENTIN 100 MG: 100 CAPSULE ORAL at 08:11

## 2024-06-08 RX ADMIN — HYDROCHLOROTHIAZIDE 25 MG: 25 TABLET ORAL at 08:12

## 2024-06-08 RX ADMIN — GABAPENTIN 100 MG: 100 CAPSULE ORAL at 13:54

## 2024-06-08 RX ADMIN — FAMOTIDINE 20 MG: 20 TABLET ORAL at 08:12

## 2024-06-08 RX ADMIN — MAGNESIUM HYDROXIDE 30 ML: 400 SUSPENSION ORAL at 12:09

## 2024-06-08 RX ADMIN — CLOPIDOGREL BISULFATE 75 MG: 75 TABLET ORAL at 08:12

## 2024-06-08 RX ADMIN — ATORVASTATIN CALCIUM 80 MG: 80 TABLET, FILM COATED ORAL at 21:21

## 2024-06-08 RX ADMIN — METOPROLOL TARTRATE 25 MG: 25 TABLET, FILM COATED ORAL at 08:12

## 2024-06-08 RX ADMIN — DICLOFENAC SODIUM 2 G: 10 GEL TOPICAL at 08:16

## 2024-06-08 RX ADMIN — METOPROLOL TARTRATE 25 MG: 25 TABLET, FILM COATED ORAL at 21:21

## 2024-06-08 ASSESSMENT — PAIN SCALES - GENERAL: PAINLEVEL_OUTOF10: 10

## 2024-06-08 ASSESSMENT — PAIN DESCRIPTION - LOCATION
LOCATION: KNEE
LOCATION: OTHER (COMMENT)

## 2024-06-08 NOTE — PLAN OF CARE
Problem: Discharge Planning  Goal: Discharge to home or other facility with appropriate resources  6/8/2024 1303 by Odalys Albarado RN  Outcome: Progressing  6/7/2024 2349 by Jin Paredes RN  Outcome: Progressing     Problem: Safety - Adult  Goal: Free from fall injury  6/8/2024 1303 by Odalys Albarado RN  Outcome: Progressing  Flowsheets (Taken 6/8/2024 1300)  Free From Fall Injury: Instruct family/caregiver on patient safety  6/7/2024 2349 by Jin Paredes, RN  Outcome: Progressing     Problem: Pain  Goal: Verbalizes/displays adequate comfort level or baseline comfort level  6/8/2024 1303 by Odalys Albarado RN  Outcome: Progressing  6/7/2024 2349 by Jin Paredes RN  Outcome: Progressing

## 2024-06-09 PROCEDURE — 6370000000 HC RX 637 (ALT 250 FOR IP): Performed by: PHYSICAL MEDICINE & REHABILITATION

## 2024-06-09 PROCEDURE — 1280000000 HC REHAB R&B

## 2024-06-09 PROCEDURE — 97530 THERAPEUTIC ACTIVITIES: CPT

## 2024-06-09 PROCEDURE — 6370000000 HC RX 637 (ALT 250 FOR IP): Performed by: INTERNAL MEDICINE

## 2024-06-09 PROCEDURE — 6360000002 HC RX W HCPCS: Performed by: INTERNAL MEDICINE

## 2024-06-09 RX ORDER — LISINOPRIL 5 MG/1
5 TABLET ORAL DAILY
Status: DISCONTINUED | OUTPATIENT
Start: 2024-06-09 | End: 2024-06-12 | Stop reason: HOSPADM

## 2024-06-09 RX ADMIN — DICLOFENAC SODIUM 2 G: 10 GEL TOPICAL at 08:21

## 2024-06-09 RX ADMIN — METOPROLOL TARTRATE 25 MG: 25 TABLET, FILM COATED ORAL at 20:07

## 2024-06-09 RX ADMIN — IBUPROFEN 600 MG: 600 TABLET ORAL at 20:07

## 2024-06-09 RX ADMIN — GABAPENTIN 100 MG: 100 CAPSULE ORAL at 08:20

## 2024-06-09 RX ADMIN — ATORVASTATIN CALCIUM 80 MG: 80 TABLET, FILM COATED ORAL at 20:08

## 2024-06-09 RX ADMIN — LISINOPRIL 5 MG: 5 TABLET ORAL at 13:33

## 2024-06-09 RX ADMIN — FAMOTIDINE 20 MG: 20 TABLET ORAL at 08:20

## 2024-06-09 RX ADMIN — GABAPENTIN 100 MG: 100 CAPSULE ORAL at 13:12

## 2024-06-09 RX ADMIN — ENOXAPARIN SODIUM 40 MG: 100 INJECTION SUBCUTANEOUS at 08:20

## 2024-06-09 RX ADMIN — HYDROCHLOROTHIAZIDE 25 MG: 25 TABLET ORAL at 08:20

## 2024-06-09 RX ADMIN — METOPROLOL TARTRATE 25 MG: 25 TABLET, FILM COATED ORAL at 08:20

## 2024-06-09 RX ADMIN — CLOPIDOGREL BISULFATE 75 MG: 75 TABLET ORAL at 08:20

## 2024-06-09 RX ADMIN — GABAPENTIN 100 MG: 100 CAPSULE ORAL at 20:08

## 2024-06-09 RX ADMIN — AMLODIPINE BESYLATE 10 MG: 10 TABLET ORAL at 08:20

## 2024-06-09 ASSESSMENT — PAIN SCALES - GENERAL
PAINLEVEL_OUTOF10: 8
PAINLEVEL_OUTOF10: 3
PAINLEVEL_OUTOF10: 3

## 2024-06-09 ASSESSMENT — PAIN DESCRIPTION - DESCRIPTORS: DESCRIPTORS: ACHING

## 2024-06-09 ASSESSMENT — PAIN - FUNCTIONAL ASSESSMENT: PAIN_FUNCTIONAL_ASSESSMENT: PREVENTS OR INTERFERES SOME ACTIVE ACTIVITIES AND ADLS

## 2024-06-09 ASSESSMENT — PAIN DESCRIPTION - LOCATION: LOCATION: GENERALIZED

## 2024-06-09 NOTE — PLAN OF CARE
Problem: Discharge Planning  Goal: Discharge to home or other facility with appropriate resources  6/9/2024 1226 by Cooper Zarate RN  Outcome: Progressing  6/9/2024 0022 by Jin Paredes RN  Outcome: Progressing     Problem: Safety - Adult  Goal: Free from fall injury  6/9/2024 1226 by Cooper Zarate RN  Outcome: Progressing  6/9/2024 0022 by Jin Paredes RN  Outcome: Progressing     Problem: Pain  Goal: Verbalizes/displays adequate comfort level or baseline comfort level  6/9/2024 1226 by Cooper Zarate RN  Outcome: Progressing  6/9/2024 0022 by Jin Paredes RN  Outcome: Progressing

## 2024-06-10 PROCEDURE — 6370000000 HC RX 637 (ALT 250 FOR IP): Performed by: PHYSICAL MEDICINE & REHABILITATION

## 2024-06-10 PROCEDURE — 97110 THERAPEUTIC EXERCISES: CPT

## 2024-06-10 PROCEDURE — 97130 THER IVNTJ EA ADDL 15 MIN: CPT

## 2024-06-10 PROCEDURE — 1280000000 HC REHAB R&B

## 2024-06-10 PROCEDURE — 97129 THER IVNTJ 1ST 15 MIN: CPT

## 2024-06-10 PROCEDURE — 97530 THERAPEUTIC ACTIVITIES: CPT

## 2024-06-10 PROCEDURE — 97535 SELF CARE MNGMENT TRAINING: CPT

## 2024-06-10 PROCEDURE — 6360000002 HC RX W HCPCS: Performed by: INTERNAL MEDICINE

## 2024-06-10 PROCEDURE — 6370000000 HC RX 637 (ALT 250 FOR IP): Performed by: INTERNAL MEDICINE

## 2024-06-10 RX ADMIN — IBUPROFEN 600 MG: 600 TABLET ORAL at 09:16

## 2024-06-10 RX ADMIN — DICLOFENAC SODIUM 2 G: 10 GEL TOPICAL at 07:55

## 2024-06-10 RX ADMIN — AMLODIPINE BESYLATE 10 MG: 10 TABLET ORAL at 07:55

## 2024-06-10 RX ADMIN — METOPROLOL TARTRATE 25 MG: 25 TABLET, FILM COATED ORAL at 20:40

## 2024-06-10 RX ADMIN — GABAPENTIN 100 MG: 100 CAPSULE ORAL at 13:51

## 2024-06-10 RX ADMIN — METOPROLOL TARTRATE 25 MG: 25 TABLET, FILM COATED ORAL at 07:55

## 2024-06-10 RX ADMIN — DICLOFENAC SODIUM 2 G: 10 GEL TOPICAL at 20:40

## 2024-06-10 RX ADMIN — LISINOPRIL 5 MG: 5 TABLET ORAL at 08:01

## 2024-06-10 RX ADMIN — MAGNESIUM HYDROXIDE 30 ML: 400 SUSPENSION ORAL at 09:16

## 2024-06-10 RX ADMIN — FAMOTIDINE 20 MG: 20 TABLET ORAL at 07:54

## 2024-06-10 RX ADMIN — HYDROCHLOROTHIAZIDE 25 MG: 25 TABLET ORAL at 07:54

## 2024-06-10 RX ADMIN — CLOPIDOGREL BISULFATE 75 MG: 75 TABLET ORAL at 07:55

## 2024-06-10 RX ADMIN — ENOXAPARIN SODIUM 40 MG: 100 INJECTION SUBCUTANEOUS at 07:54

## 2024-06-10 RX ADMIN — GABAPENTIN 100 MG: 100 CAPSULE ORAL at 07:54

## 2024-06-10 RX ADMIN — GABAPENTIN 100 MG: 100 CAPSULE ORAL at 20:40

## 2024-06-10 RX ADMIN — ATORVASTATIN CALCIUM 80 MG: 80 TABLET, FILM COATED ORAL at 20:40

## 2024-06-10 ASSESSMENT — PAIN DESCRIPTION - LOCATION: LOCATION: VAGINA

## 2024-06-10 ASSESSMENT — PAIN SCALES - GENERAL
PAINLEVEL_OUTOF10: 0
PAINLEVEL_OUTOF10: 10
PAINLEVEL_OUTOF10: 0
PAINLEVEL_OUTOF10: 0

## 2024-06-10 ASSESSMENT — PAIN DESCRIPTION - DESCRIPTORS: DESCRIPTORS: ACHING

## 2024-06-10 NOTE — PLAN OF CARE
Problem: Discharge Planning  Goal: Discharge to home or other facility with appropriate resources  6/9/2024 2339 by Anny Dawn, RN  Outcome: Progressing  Flowsheets (Taken 6/9/2024 2000)  Discharge to home or other facility with appropriate resources: Identify barriers to discharge with patient and caregiver  6/9/2024 1226 by Cooper Zarate RN  Outcome: Progressing     Problem: Safety - Adult  Goal: Free from fall injury  6/9/2024 1226 by Cooper Zarate RN  Outcome: Progressing     Problem: Pain  Goal: Verbalizes/displays adequate comfort level or baseline comfort level  6/9/2024 2339 by Anny Dawn RN  Outcome: Progressing  Flowsheets (Taken 5/31/2024 2030 by Emiliana Rome RN)  Verbalizes/displays adequate comfort level or baseline comfort level: Encourage patient to monitor pain and request assistance  6/9/2024 1226 by Cooper Zarate RN  Outcome: Progressing      Patient notified  has not yet reviewed test results; will contact patient once results are available.  Patient verbalizes understanding and denies questions or needs at this time.

## 2024-06-10 NOTE — PLAN OF CARE
Problem: Discharge Planning  Goal: Discharge to home or other facility with appropriate resources  6/10/2024 0959 by Odalys Albarado RN  Outcome: Progressing  6/9/2024 2339 by Anny Dawn, RN  Outcome: Progressing  Flowsheets (Taken 6/9/2024 2000)  Discharge to home or other facility with appropriate resources: Identify barriers to discharge with patient and caregiver     Problem: Safety - Adult  Goal: Free from fall injury  Outcome: Progressing  Flowsheets (Taken 6/10/2024 0956)  Free From Fall Injury: Instruct family/caregiver on patient safety     Problem: Pain  Goal: Verbalizes/displays adequate comfort level or baseline comfort level  6/10/2024 0959 by Odalys Albarado RN  Outcome: Progressing  6/9/2024 2339 by Anny Dawn RN  Outcome: Progressing  Flowsheets (Taken 5/31/2024 2030 by Emiliana Rome, RN)  Verbalizes/displays adequate comfort level or baseline comfort level: Encourage patient to monitor pain and request assistance

## 2024-06-11 PROCEDURE — 97110 THERAPEUTIC EXERCISES: CPT

## 2024-06-11 PROCEDURE — 6360000002 HC RX W HCPCS: Performed by: INTERNAL MEDICINE

## 2024-06-11 PROCEDURE — 1280000000 HC REHAB R&B

## 2024-06-11 PROCEDURE — 97530 THERAPEUTIC ACTIVITIES: CPT

## 2024-06-11 PROCEDURE — 6370000000 HC RX 637 (ALT 250 FOR IP): Performed by: PHYSICAL MEDICINE & REHABILITATION

## 2024-06-11 PROCEDURE — 6370000000 HC RX 637 (ALT 250 FOR IP): Performed by: INTERNAL MEDICINE

## 2024-06-11 PROCEDURE — 97130 THER IVNTJ EA ADDL 15 MIN: CPT

## 2024-06-11 PROCEDURE — 97129 THER IVNTJ 1ST 15 MIN: CPT

## 2024-06-11 RX ORDER — CLOPIDOGREL BISULFATE 75 MG/1
75 TABLET ORAL DAILY
Qty: 30 TABLET | Refills: 0 | Status: SHIPPED | OUTPATIENT
Start: 2024-06-12

## 2024-06-11 RX ORDER — HYDROCHLOROTHIAZIDE 25 MG/1
25 TABLET ORAL DAILY
Qty: 30 TABLET | Refills: 0 | Status: SHIPPED | OUTPATIENT
Start: 2024-06-12

## 2024-06-11 RX ORDER — FAMOTIDINE 20 MG/1
20 TABLET, FILM COATED ORAL DAILY
Qty: 60 TABLET | Refills: 0 | Status: SHIPPED | OUTPATIENT
Start: 2024-06-12

## 2024-06-11 RX ORDER — DIPHENHYDRAMINE HCL 25 MG
25 TABLET ORAL EVERY 6 HOURS PRN
Status: DISCONTINUED | OUTPATIENT
Start: 2024-06-11 | End: 2024-06-12 | Stop reason: HOSPADM

## 2024-06-11 RX ORDER — AMLODIPINE BESYLATE 10 MG/1
10 TABLET ORAL DAILY
Qty: 30 TABLET | Refills: 0 | Status: SHIPPED | OUTPATIENT
Start: 2024-06-12

## 2024-06-11 RX ORDER — ATORVASTATIN CALCIUM 80 MG/1
80 TABLET, FILM COATED ORAL NIGHTLY
Qty: 30 TABLET | Refills: 3 | Status: SHIPPED | OUTPATIENT
Start: 2024-06-11

## 2024-06-11 RX ORDER — GABAPENTIN 100 MG/1
100 CAPSULE ORAL 3 TIMES DAILY
Qty: 90 CAPSULE | Refills: 0 | Status: SHIPPED | OUTPATIENT
Start: 2024-06-11 | End: 2024-07-11

## 2024-06-11 RX ORDER — NICOTINE 21 MG/24HR
1 PATCH, TRANSDERMAL 24 HOURS TRANSDERMAL DAILY
Qty: 30 PATCH | Refills: 0 | Status: SHIPPED | OUTPATIENT
Start: 2024-06-12

## 2024-06-11 RX ORDER — LISINOPRIL 5 MG/1
5 TABLET ORAL DAILY
Qty: 30 TABLET | Refills: 0 | Status: SHIPPED | OUTPATIENT
Start: 2024-06-12

## 2024-06-11 RX ADMIN — CLOPIDOGREL BISULFATE 75 MG: 75 TABLET ORAL at 08:02

## 2024-06-11 RX ADMIN — ENOXAPARIN SODIUM 40 MG: 100 INJECTION SUBCUTANEOUS at 08:02

## 2024-06-11 RX ADMIN — DIPHENHYDRAMINE HYDROCHLORIDE 25 MG: 25 TABLET ORAL at 08:35

## 2024-06-11 RX ADMIN — GABAPENTIN 100 MG: 100 CAPSULE ORAL at 20:56

## 2024-06-11 RX ADMIN — GABAPENTIN 100 MG: 100 CAPSULE ORAL at 08:03

## 2024-06-11 RX ADMIN — HYDROCHLOROTHIAZIDE 25 MG: 25 TABLET ORAL at 08:03

## 2024-06-11 RX ADMIN — FAMOTIDINE 20 MG: 20 TABLET ORAL at 08:03

## 2024-06-11 RX ADMIN — METOPROLOL TARTRATE 25 MG: 25 TABLET, FILM COATED ORAL at 20:57

## 2024-06-11 RX ADMIN — ATORVASTATIN CALCIUM 80 MG: 80 TABLET, FILM COATED ORAL at 20:57

## 2024-06-11 RX ADMIN — GABAPENTIN 100 MG: 100 CAPSULE ORAL at 13:48

## 2024-06-11 RX ADMIN — IBUPROFEN 600 MG: 600 TABLET ORAL at 20:56

## 2024-06-11 RX ADMIN — MELATONIN 3 MG ORAL TABLET 3 MG: 3 TABLET ORAL at 20:56

## 2024-06-11 RX ADMIN — LISINOPRIL 5 MG: 5 TABLET ORAL at 08:02

## 2024-06-11 RX ADMIN — DICLOFENAC SODIUM 2 G: 10 GEL TOPICAL at 08:03

## 2024-06-11 RX ADMIN — METOPROLOL TARTRATE 25 MG: 25 TABLET, FILM COATED ORAL at 08:03

## 2024-06-11 RX ADMIN — AMLODIPINE BESYLATE 10 MG: 10 TABLET ORAL at 08:02

## 2024-06-11 ASSESSMENT — PAIN DESCRIPTION - DESCRIPTORS: DESCRIPTORS: ACHING

## 2024-06-11 ASSESSMENT — PAIN SCALES - GENERAL
PAINLEVEL_OUTOF10: 10
PAINLEVEL_OUTOF10: 2
PAINLEVEL_OUTOF10: 0

## 2024-06-11 ASSESSMENT — PAIN DESCRIPTION - LOCATION: LOCATION: GENERALIZED

## 2024-06-11 NOTE — PLAN OF CARE
Problem: Discharge Planning  Goal: Discharge to home or other facility with appropriate resources  Outcome: Progressing     Problem: Safety - Adult  Goal: Free from fall injury  Outcome: Progressing  Flowsheets (Taken 6/11/2024 0025)  Free From Fall Injury: Instruct family/caregiver on patient safety     Problem: Pain  Goal: Verbalizes/displays adequate comfort level or baseline comfort level  Outcome: Progressing

## 2024-06-11 NOTE — CARE COORDINATION
Per team meeting:  d/c changed to 6/12.  Updated patient.  Patient states she will call her daughter herself for the date change and transportation home.     DME - Declines 3 in 1 commode.  She states she may consider ETB.  SW educated her on safety issues and therapy recommendations as to why.     Home RN/PT/OT/SP/aide - referral to [facility] Vinny Mccormick.    FT - with daughter 6/6pm     JERO Bonds

## 2024-06-11 NOTE — PATIENT CARE CONFERENCE
[facility]  INPATIENT ACUTE REHABILITATION  TEAM CONFERENCE NOTE/PATIENT PLAN OF CARE    The physician was present and led this team conference    Date: 2024  Admission date: 2024  Patient Name: Rivka Gamboa        MRN: [id number]    : 1955  (69 y.o.)  Gender: female   Rehab diagnosis/surgery with date:  CVA of 24  Impairment Group Code:  1.2      MEDICAL/FUNCTIONAL HISTORY/STATUS:  right side weakness, BP still running high (139/111) at 0800 this am, meds being adjusted  balance improving but remains impulsive    Consultations/Labs/X-rays: none recent      MEDICATION UPDATE:      metoprolol tartrate  25 mg Oral BID     diclofenac sodium  2 g Topical BID    famotidine  20 mg Oral Daily    enoxaparin  40 mg SubCUTAneous Daily    atorvastatin  80 mg Oral Nightly    clopidogrel  75 mg Oral Daily    nicotine  1 patch TransDERmal Daily    amLODIPine  10 mg Oral Daily    hydroCHLOROthiazide  25 mg Oral Daily    gabapentin  100 mg Oral TID           NURSING :    Bowel:   Always Continent  [x]   Occasionally incontinent  []   Frequently incontinent  []   Always incontinent  []   Not occurred  []     Bladder:   Always Continent  [x]    Incontinent less than daily[]   Incontinent  daily []   Always incontinent  []   No urine output    []   Indwelling catheter []       Toilet Hygiene:   Current level : standby assist.  Short term Toilet hygiene goal: Modified independent.  Long term toilet hygiene goal:  Modified independent.    Skin integrity: intact  Pain: vaginal pain, will have patient follow up with gyn after discharge    NUTRITION    Diet  regular low Na 2 gram  Liquid consistency   thin    SOCIAL INFORMATION:  Lives with: alone  Prior community services:  none  Home Architecture:  2nd floor apt , 6 entry steps with 1 rail, all 1 level once inside  Prior Level of function:  independent, did not drive  DME:  none    FAMILY / PATIENT EDUCATION:   patient's daughter has been in for hands

## 2024-06-11 NOTE — PLAN OF CARE
Problem: Discharge Planning  Goal: Discharge to home or other facility with appropriate resources  6/11/2024 0913 by Odalys Albarado RN  Outcome: Progressing  6/11/2024 0029 by Flora Mims RN  Outcome: Progressing     Problem: Safety - Adult  Goal: Free from fall injury  6/11/2024 0913 by Odalys Albarado RN  Outcome: Progressing  Flowsheets (Taken 6/11/2024 0905)  Free From Fall Injury: Instruct family/caregiver on patient safety  6/11/2024 0029 by Flora Mims RN  Outcome: Progressing  Flowsheets (Taken 6/11/2024 0025)  Free From Fall Injury: Instruct family/caregiver on patient safety     Problem: Pain  Goal: Verbalizes/displays adequate comfort level or baseline comfort level  6/11/2024 0913 by Odalys Albarado RN  Outcome: Progressing  6/11/2024 0029 by Flora Mims RN  Outcome: Progressing

## 2024-06-12 VITALS
SYSTOLIC BLOOD PRESSURE: 118 MMHG | RESPIRATION RATE: 16 BRPM | OXYGEN SATURATION: 98 % | TEMPERATURE: 98 F | HEIGHT: 67 IN | DIASTOLIC BLOOD PRESSURE: 77 MMHG | HEART RATE: 63 BPM | WEIGHT: 184 LBS | BODY MASS INDEX: 28.88 KG/M2

## 2024-06-12 PROCEDURE — 6370000000 HC RX 637 (ALT 250 FOR IP): Performed by: PHYSICAL MEDICINE & REHABILITATION

## 2024-06-12 PROCEDURE — 6370000000 HC RX 637 (ALT 250 FOR IP): Performed by: INTERNAL MEDICINE

## 2024-06-12 PROCEDURE — 6360000002 HC RX W HCPCS: Performed by: INTERNAL MEDICINE

## 2024-06-12 RX ADMIN — GABAPENTIN 100 MG: 100 CAPSULE ORAL at 08:06

## 2024-06-12 RX ADMIN — HYDROCHLOROTHIAZIDE 25 MG: 25 TABLET ORAL at 08:06

## 2024-06-12 RX ADMIN — LISINOPRIL 5 MG: 5 TABLET ORAL at 08:06

## 2024-06-12 RX ADMIN — FAMOTIDINE 20 MG: 20 TABLET ORAL at 08:06

## 2024-06-12 RX ADMIN — ENOXAPARIN SODIUM 40 MG: 100 INJECTION SUBCUTANEOUS at 08:06

## 2024-06-12 RX ADMIN — METOPROLOL TARTRATE 25 MG: 25 TABLET, FILM COATED ORAL at 08:06

## 2024-06-12 RX ADMIN — CLOPIDOGREL BISULFATE 75 MG: 75 TABLET ORAL at 08:06

## 2024-06-12 RX ADMIN — DICLOFENAC SODIUM 2 G: 10 GEL TOPICAL at 08:07

## 2024-06-12 RX ADMIN — AMLODIPINE BESYLATE 10 MG: 10 TABLET ORAL at 08:06

## 2024-06-12 NOTE — PLAN OF CARE
Problem: Discharge Planning  Goal: Discharge to home or other facility with appropriate resources  Outcome: Completed     Problem: Safety - Adult  Goal: Free from fall injury  6/12/2024 1106 by Odalys Albarado RN  Outcome: Completed  6/11/2024 2222 by Opal Meehan RN  Outcome: Progressing     Problem: Pain  Goal: Verbalizes/displays adequate comfort level or baseline comfort level  6/12/2024 1106 by Odalys Albarado RN  Outcome: Completed  6/11/2024 2222 by Opal Meehan RN  Outcome: Progressing

## 2024-06-12 NOTE — DISCHARGE SUMMARY
[facility]              [address] [place] [address]                            DISCHARGE SUMMARY      PATIENT NAME: CAROLINA BETHEA                 : 1955  MED REC NO: [id number]                        ROOM: 5507  ACCOUNT NO: [id number]                       ADMIT DATE: 2024  PROVIDER: Ariel Tao MD      INTRODUCTION:  The patient was admitted to [facility] on 2024 with sudden onset of right hemiparesis.  She did not have TNK or thrombectomy.  She was confirmed to have a stroke at the left internal capsule and was admitted to acute rehab on 2024.    HOSPITAL COURSE:  On admission, the patient was felt to be a good rehab candidate.  She showed excellent progress in all areas throughout her stay.  She was at a supervisory to standby level with all of her functioning.  She did have some issues still with balance, but steadily improved.  She was ready for discharge home in good condition on 2024.  Medical status is stable.    LABORATORY DATA:  CBC and metabolic profile were unremarkable other than a BUN of 30, creatinine 1.6.    MEDICATIONS:  Norvasc 10 mg daily, Lipitor 80 mg nightly, Plavix 75 mg daily, Neurontin 100 mg t.i.d., HydroDIURIL 25 mg daily, Zestril 5 mg daily, Lopressor 25 mg daily, and a Nicoderm patch.    DIAGNOSES:    1. Cerebrovascular accident with right hemiparesis.  2. Hypertension.  3. Nicotine addiction.          ARIEL TAO MD      D:  2024 08:36:32     T:  2024 08:48:09     TAP/AQS  Job #:  [id number]     Doc#:  [id number]

## 2024-06-12 NOTE — PLAN OF CARE
Problem: Discharge Planning  Goal: Discharge to home or other facility with appropriate resources  6/11/2024 0913 by Odalys Albarado RN  Outcome: Progressing     Problem: Safety - Adult  Goal: Free from fall injury  6/11/2024 2222 by Opal Meehan RN  Outcome: Progressing  6/11/2024 0913 by Odalys Albarado RN  Outcome: Progressing  Flowsheets (Taken 6/11/2024 0905)  Free From Fall Injury: Instruct family/caregiver on patient safety     Problem: Pain  Goal: Verbalizes/displays adequate comfort level or baseline comfort level  6/11/2024 2222 by Opal Meehan RN  Outcome: Progressing  6/11/2024 0913 by Odalys Albarado RN  Outcome: Progressing

## 2024-06-12 NOTE — PROGRESS NOTES
4 Eyes Skin Assessment     NAME:  Rivka Gamboa  YOB: 1955  MEDICAL RECORD NUMBER:  [id number]    The patient is being assessed for  Admission    I agree that at least one RN has performed a thorough Head to Toe Skin Assessment on the patient. ALL assessment sites listed below have been assessed.      Areas assessed by both nurses:    Head, Face, Ears, Shoulders, Back, Chest, Arms, Elbows, Hands, Sacrum. Buttock, Coccyx, Ischium, and Legs. Feet and Heels        Does the Patient have a Wound? No noted wound(s)       Shivam Prevention initiated by RN: No  Wound Care Orders initiated by RN: No    Pressure Injury (Stage 3,4, Unstageable, DTI, NWPT, and Complex wounds) if present, place Wound referral order by RN under : No    New Ostomies, if present place, Ostomy referral order under : No     Nurse 1 eSignature: Electronically signed by Cooper Zarate RN on 5/31/24 at 4:45 PM EDT    **SHARE this note so that the co-signing nurse can place an eSignature**    Nurse 2 eSignature: Electronically signed by Odalys Albarado RN on 5/31/24 at 5:00 PM EDT    
CLINICAL PHARMACY NOTE: MEDS TO BEDS    Total # of Prescriptions Filled: 8   The following medications were delivered to the patient:  Pepcid 20  Metoprolol 25  Hydrochlorothiazide 25  Lipitor 80  Amlodipine 10  Plavix 75  Nicotine 21 Patch  Gabapentin 100    Additional Documentation:   To PT  
Comprehensive Nutrition Assessment    Type and Reason for Visit:  Initial, RD Nutrition Re-Screen/LOS    Nutrition Assessment:    Pt assessed per LOS protocol. Chart reviewed. Pt. tolerating diet currently eating  % at most meals and appears stable from a nutritional standpoint. No nutrition intervention indicated at this time. Will follow per policy. Consult RD if needed.      Kiran Blas RD  Contact: ext [phone]     
Copy of disclosure statement and \"Privacy Act Statement-Health Care Records\" given to patient along with signed copy of admit Medicare Important Message.  
Discharge instructions explained to patient, all questions answered. Patient and her belongings taken downstairs at this time, daughter present.   
OCCUPATIONAL THERAPY INITIAL EVALUATION    [facility]  [address], [place]    Date:2024                                                  Patient Name: Rivka Gamboa    MRN: [id number]    : 1955    Room: [address] [place] [address]      Evaluating OT: Francie Rodríguez, OTR/L, [id number]    Referring Provider:Ariel Tao MD   Specific Provider Orders/Date: OT Eval and Treat 24    Diagnosis: Acute cerebrovascular accident (CVA) (HCC) [I63.9]   Surgery: NA        Referring Practitioner: Ariel Tao MD   Diagnosis: CVA  Additional Pertinent Hx: HTN  Restrictions/Precautions: Fall Risk, possible R inattention, R UE/LE weakness    Discharge Recommendations: Home with assist as needed & home OT     Subjective   General  Chart Reviewed: Yes  Additional Pertinent Hx:  HTN  Family / Caregiver Present: No  Referring Practitioner: Ariel Tao MD   Diagnosis: CVA  Subjective: Pt presents seated on toilet, agreeable to OT intervention  Pain Comments: Pt did c/o foot pain s/p functional mobility, RN informed     Social/Functional History  Lives With: Pt lives alone  Type of Home: 2nd floor apartment  Home Layout: one unit  Home Access: Stairs to enter with rails  Entrance Stairs - Number of Steps: 6 EKTA 1 HR  Bathroom Shower/Tub: Tub/Shower unit with curtain  Bathroom Toilet:  standard  Home Equipment: none  ADL Assistance: independent  Homemaking Assistance: independent  Ambulation Assistance: independent  Transfer Assistance: independent  Active : Yes, does not have a car  Occupation: retired home health aide  IADL Comments: PLOF pt independent in all areas- ADLs, transfers, mobility, IADLs & driving     Safety Devices  Type of Devices: All fall risk precautions in place     Toilet Transfers  Toilet - Technique: Mod A stand pivot   Equipment Used: grab bar  Toilet Transfer: Pt require assist & vc's for body alignment and hand placement  Toilet 
Occupational Therapy  OCCUPATIONAL THERAPY DAILY NOTE    Date:2024  Patient Name: Rivka Gamboa  MRN: [id number]  : 1955  Room: [address] [place] [address]     Evaluating OT: Francie Rodríguez OTR/L, [id number]  Referring Provider:Ariel Tao MD   Diagnosis: CVA  Additional Pertinent Hx: HTN  Restrictions/Precautions: Fall Risk, possible R inattention, R UE/LE weakness     Functional Assessment:   Date Status AE  Comments   Feeding 24 Independent     Grooming 24 Mod I           Oral Care 24 Mod I     Bathing 24 CGA/SBA     UB Dressing 24 Independent      LB Dressing 24 Min A     Footwear 24 SBA LH shoe horn     Toileting 24 Min/CGA     Homemaking 24  SBA W/c level       Functional Transfers / Balance:   Date Status DME  Comments   Sit Balance 24  Supervision     Stand Balance 24  SBA/CGA No device   High/low table  Demo'd during transfer   [x] Tub  [x] Shower   Transfer 6/3/24    6/5/24  CGA    Min A Ext tub bench   Shower bench    Commode   Transfer 24 CGA Std commode     Functional   Mobility 24 Min/CGA No device  Short distances with min cues for safety and insight   Other:  SPT    Sit to stand  Firm recliner, sofa, dining chair with arms and without & queen bed     Supine <> sit     Sit<>Stand 24 Min/CGA    CGA/min A                SBA    Min/CGA     No device  For safety                    For safety    For safety.     Functional Exercises / Activity:  -Therapeutic leisure card game activity completed seated at table with 1# wrist weights donned with focus on increasing B UE strength/endurance, GM/FM coordination and increasing attention to R side. Pt required Min A to follow simple rules to new game. Fair+ tolerance.  -UBE completed seated 2x5 minutes to increase B UE strength and overall endurance for functional activities. Fair+ tolerance.   -Green/white block patterns completed seated at table with focus on increasing 
Occupational Therapy  OCCUPATIONAL THERAPY DAILY NOTE    Date:2024  Patient Name: Rivka Gamboa  MRN: [id number]  : 1955  Room: [address] [place] [address]     Evaluating OT: Francie Rodríguez OTR/L, [id number]  Referring Provider:Ariel Tao MD   Diagnosis: CVA  Additional Pertinent Hx: HTN  Restrictions/Precautions: Fall Risk, possible R inattention, R UE/LE weakness     Functional Assessment:   Date Status AE  Comments   Feeding 6/3/24  Set up     Grooming 6/3/24  Setup           Oral Care 6/3/24 SBA     Bathing 6/3/24  SBA     UB Dressing 6/3/24  Setup     LB Dressing 6/3/24  Mod A     Footwear 24  Min A LH shoe horn  PM: Pt needed time using LH shoe horn to jeniffer shoes and attempted BLE crossover method to start over feet while seated in w/c.  Pt able to kick off shoes to doff. Pt used plastic LHSH since does like metal device needing occ cues for proper technique.  Pt has own method to jeniffer on shoes needing time using plastic LHSH and pulling back of heel loop.  Pt was able to tie shoelaces on both shoes incorporating R hand to assist L hand for first time needing time to complete task.    Toileting 24  Mod A     Homemaking 24  SBA W/c level  AM: Pt folded & stacked washcloths while standing at high/low table to increase dyn standing skills.       Functional Transfers / Balance:   Date Status DME  Comments   Sit Balance 24  Supervision  Demo'd sitting balance in am up in w/c during therapeutic tasks.  PM: Demo'd sitting balance on various surfaces in apt to increase skills for home environment.    Stand Balance 24  SBA/CGA No device   High/low table  Demo'd standing balance at table during hmkg and peg board/design activity to increase dyn balance skills.   PM: Demo'd standing balance during mobility using no device and sit to stand transfers.    [x] Tub  [x] Shower   Transfer 6/3/24    6/4/24  CGA    CGA Ext tub bench  PM: Pt needed one cue for proper hand/trunk placement on ext tub bench to 
Occupational Therapy  OCCUPATIONAL THERAPY DAILY NOTE    Date:2024  Patient Name: Rivka Gamboa  MRN: [id number]  : 1955  Room: [address] [place], [place] [address]     Evaluating OT: Francie Rodríguez OTR/L, [id number]     Referring Provider:Ariel Tao MD     Diagnosis: CVA  Additional Pertinent Hx: HTN  Restrictions/Precautions: Fall Risk, possible R inattention, R UE/LE weakness       Functional Assessment:   Date Status AE  Comments   Feeding 24  Set up  Per eval    Grooming 24  SBA           Oral Care 24 SBA     Bathing 24  UB- setup  LB- mod A  Per eval- simulated while standing   UB Dressing 24  SBA  Per eval    LB Dressing 24  Mod A  \"   Footwear 24  Mod A     Toileting 24  Mod A     Homemaking   TBA  \"     Functional Transfers / Balance:   Date Status DME  Comments   Sit Balance 24  SBA  Per eval   Stand Balance 24  Min/mod A  \"   [] Tub  [x] Shower   Transfer 24  Mod A     Commode   Transfer 24  Mod A     Functional   Mobility 24  Min/mod A  Hand held per eval - pt declined device/ dragging R foot   Other:  SPT    Sit to stand    Sit to supine 24 Mod A    Mod A    Min A          \"     Functional Exercises / Activity:   BUE AROM exercises conducted in bed per patient agreement due to having LLE and vaginal pain 6-8/10 and given tylenol for pain.   Pt completed brief reps using 1# wt with LUE for shoulder flexion/extension while in bed then RUE needing assist due to R side weakness for follow thru.   Graded task to use 2# ball with bilateral skills with bicep curls and patient stating \"Im having too much pain in LUE\"     Therapist stopped task and offered to patient about just resting right  now since given tylenol.   Patient agreed and OT session discontinued.     Sensory / Neuromuscular Re-Education:      Cognitive Skills:   Status Comments   Problem   Solving fair    Memory fair    Sequencing fair     Safety fair Mild impulsivity     Visual 
Occupational Therapy  OCCUPATIONAL THERAPY DAILY NOTE    Date:2024  Patient Name: Rivka Gamboa  MRN: [id number]  : 1955  Room: [address] [place] [address]     Evaluating OT: Francie Rodríguez, OTR/L, [id number]  Referring Provider:Ariel Tao MD   Diagnosis: CVA  Additional Pertinent Hx: HTN  Restrictions/Precautions: Fall Risk, possible R inattention, R UE/LE weakness     Functional Assessment:   Date Status AE  Comments   Feeding 24 Independent     Grooming 24 Mod I  Completed seated at sink.          Oral Care 24 Mod I  Completed seated at sink.   Bathing 24 CGA/SBA  Full shower completed seated/standing with assist required for dynamic balance/safety while standing to wash d/t impulsivity and unsteadiness. Min cues for safety and fall prevention provided.   UB Dressing 24 Independent   To don/doff pull over shirt and bra while seated in chair   LB Dressing 24 Min A  To don underwear and leggings with assist required for balance/safety and to manage clothing over hips. Min cues for safety required.   Footwear 24 SBA LH shoe horn  To don/doff socks and slippers utilizing leg cross over technique.   Toileting 24 Min/CGA  For balance/safety during clothing management d/t unsteadiness.    Homemaking 24  SBA W/c level       Functional Transfers / Balance:   Date Status DME  Comments   Sit Balance 24  Supervision  Demo'd during functional dynamic ADL tasks.   Stand Balance 24  SBA/CGA No device   High/low table  Demo'd during functional dynamic ADL tasks.    [x] Tub  [x] Shower   Transfer 6/3/24    6/5/24  CGA    Min A Ext tub bench   Shower bench     For safety d/t impulsivity and unsteadiness.    Commode   Transfer 24 CGA Std commode  Min cues for safety   Functional   Mobility 24 Min/CGA No device  Completed within pt's room/bathroom with assist required for balance/safety d/t unsteadiness and decreased attention to environmental obstacles on R side requiring cues for 
Occupational Therapy  OCCUPATIONAL THERAPY DAILY NOTE    Date:2024  Patient Name: Rivka Gamboa  MRN: [id number]  : 1955  Room: [address] [place] [address]A     Evaluating OT: Francie Rodríguez, OTR/L, [id number]  Referring Provider:Ariel Tao MD   Diagnosis: CVA  Additional Pertinent Hx: HTN  Restrictions/Precautions: Fall Risk, possible R inattention, R UE/LE weakness     Functional Assessment:   Date Status AE  Comments   Feeding 6/10/24 Independent     Grooming 6/10/24 Mod I           Oral Care 6/10/24 Mod I     Bathing 6/10/24 SBA/S Seated and standing     UB Dressing 6/10/24 Independent      LB Dressing 6/10/24 SBA/S     Footwear 24 Supervision   To don/doff socks and shoes seated utilizing leg cross over technique.   Toileting 6/10/24 SBA     Homemaking 24 SBA/CGA Standing       Functional Transfers / Balance:   Date Status DME  Comments   Sit Balance 24 Independent     Stand Balance 24 Mod I No device     [x] Tub  [x] Shower   Transfer 24  SBA/CGA    Min A Ext tub bench   Shower bench    Commode   Transfer 24 Supervision Std commode   Grab bar  For safety   Functional   Mobility 24 Supervision No device  For safety.   Other:  SPT    Sit to stand  Firm recliner, sofa, dining chair with arms and without & queen bed     Supine <> sit       Sit<>Stand 24 Supervision    SBA/CGA                Mod I      Supervision     No device       Functional Exercises / Activity:   -Functional medication management activity completed seated at table. Pt required Min A to complete and Min cues to recognize errors. Completed to increase independence with functional daily activity and GM/FM coordination. Pt limited significantly throughout activity with managing \"pills\" secondary to fingernail length causing frequent dropping (sometimes into wrong date on pill organizer, or floor) and difficulty with manipulation. Trialed utilizing spoon to 
Occupational Therapy  OCCUPATIONAL THERAPY DAILY NOTE    Date:2024  Patient Name: Rivka Gamboa  MRN: [id number]  : 1955  Room: [address] [place] [address]A     Evaluating OT: Francie Rodríguez, OTR/L, [id number]  Referring Provider:Ariel Tao MD   Diagnosis: CVA  Additional Pertinent Hx: HTN  Restrictions/Precautions: Fall Risk, possible R inattention, R UE/LE weakness     Functional Assessment:   Date Status AE  Comments   Feeding 24 Independent     Grooming 24 Mod I/Sup  Pt completed grooming tasks while standing at sink.          Oral Care 24 Mod I  To brush teeth and use mouthwash.    Bathing 24 SBA Seated and standing  Pt declined shower just wanting to sponge bath this am.  Pt completed UB/LB skills with no assist and likes  having her PRIVACY when getting washed up and dressed.    UB Dressing 24 Independent   To jeniffer t- shirt seated in w/c.    LB Dressing 24 SBA  Pt donned underwear and stretchy pants seated in w/c and stood for clothing mgmt at sink.     Footwear 24 SBA/CGA   To jeniffer/doff socks while seated at EOB using crossover method with BLE's.   Pt donned on shoes using LH shoe horn with some assist to pull up backs up over heels using loop. Pt needed time to complete task and tied shoe laces using bilateral skills seated on EOB.     Toileting 24 Min/CGA     Homemaking 24 SBA/CGA Standing  Pt stood for take out 3 pieces of clothing from dryer, hang on hangers and put away into closet needing assist for balance & safety. Pt required  cues for proper body mechanics when bending and reaching to increase safety.        Functional Transfers / Balance:   Date Status DME  Comments   Sit Balance 24  Supervision  Demo'd sitting balance on EOB, up in w/c and on various surfaces in apt needing cues to reach back using RUE/hand not just LUE only to facilitate wt bearing thru RUE/hand.    Stand Balance 24  SBA/CGA No device   sink Demo'd standing balance during ADL tasks, sit to 
Occupational Therapy  OCCUPATIONAL THERAPY DAILY NOTE    Date:2024  Patient Name: Rivka Gamboa  MRN: [id number]  : 1955  Room: [address] [place] [address]A     Evaluating OT: Francie Rodríguez, OTR/L, [id number]  Referring Provider:Ariel Tao MD   Diagnosis: CVA  Additional Pertinent Hx: HTN  Restrictions/Precautions: Fall Risk, possible R inattention, R UE/LE weakness     Functional Assessment:   Date Status AE  Comments   Feeding 24 Independent     Grooming 24 Supervision  Standing at sink to wash hands          Oral Care 24 Mod I     Bathing 24 CGA/SBA     UB Dressing 24 Independent   Seated to doff/jeniffer long sleeve cardigan    LB Dressing 24 Min A     Footwear 24 SBA   To don/doff slippers seated at EOB utilizing leg cross over technique.   Toileting 24 Min/CGA     Homemaking 24 Min A  Pt completed multiple homemaking tasks, removing sheets from bed, making bed, folding sheets and blankets. Pt also picked up items around the room, retrieved items from the ground. Pt was educated on safety at times, regarding positioning of her body in best position to prevent falls, attending to R side of environment and holding onto solid surface when reaching down to the floor.   Completed community distance, down to cafeteria, pt able to manage elevators. Assist was needed for balance while pt gathered chocolates and placed into bag due to fatigue. Pt was to access her wallet & card to pay for her chocolate and place into card machine with good results. Pt was able to recall her way from cafeteria back to her room.      Functional Transfers / Balance:   Date Status DME  Comments   Sit Balance 24  Supervision     Stand Balance 24  SBA/CGA No device   High/low table  Demo'd during transfer   [x] Tub  [x] Shower   Transfer 6/3/24    6/5/24  CGA    Min A Ext tub bench   Shower bench    Commode   Transfer 24 CGA Std commode     Functional   Mobility 24 Min/CGA No device  In room 
Occupational Therapy  OCCUPATIONAL THERAPY DAILY NOTE    Date:6/10/2024  Patient Name: Rivka Gamboa  MRN: [id number]  : 1955  Room: [address] [place] [address]     Evaluating OT: Francie Rodríguez, OTR/L, [id number]  Referring Provider:Ariel Tao MD   Diagnosis: CVA  Additional Pertinent Hx: HTN  Restrictions/Precautions: Fall Risk, possible R inattention, R UE/LE weakness     Functional Assessment:   Date Status AE  Comments   Feeding 6/10/24 Independent     Grooming 6/10/24 Mod I  Completed seated at sink.         Oral Care 6/10/24 Mod I  Completed seated at sink.   Bathing 6/10/24 SBA/S Seated and standing  Sponge bath completed seated/standing at sink. Assist required d/t occasional unsteadiness and wet surfaces.   UB Dressing 6/10/24 Independent   To don/doff pull over shirt.   LB Dressing 6/10/24 SBA/S  To don/doff underwear and pants with assist required for dynamic balance/safety while standing to complete clothing management d/t unsteadiness.   Footwear 6/10/24 Min A   To don/doff socks and shoes. Pt required assist to don  R shoe this date. Pt able to tie B shoes with increased time.   Toileting 6/10/24 SBA  For balance/safety.   Homemaking 24 SBA/CGA Standing       Functional Transfers / Balance:   Date Status DME  Comments   Sit Balance 6/10/24 Supervision  Demo'd during functional dynamic ADL tasks.   Stand Balance 6/10/24 SBA No device   sink Demo'd during functional transfers and ADL tasks.    [x] Tub  [x] Shower   Transfer 24  SBA/CGA    Min A Ext tub bench   Shower bench    Commode   Transfer 6/10/24 SBA Std commode   Grab bar  For balance/safety d/t unsteadiness and decreased insight.   Functional   Mobility 6/10/24 SBA/CGA No device  Completed < household distances within pt's room/bathroom with assist for balance/safety. Min cues required to safely maneuver around environmental obstacles.   Other:  SPT    Sit to stand  Firm recliner, sofa, dining chair with arms and without & queen 
Occupational Therapy  OCCUPATIONAL THERAPY DAILY NOTE    Date:6/3/2024  Patient Name: Rivka Gamboa  MRN: [id number]  : 1955  Room: [address] [place] [address]A     Evaluating OT: Francie Rodríguez, OTR/L, [id number]     Referring Provider:Ariel Tao MD     Diagnosis: CVA  Additional Pertinent Hx: HTN  Restrictions/Precautions: Fall Risk, possible R inattention, R UE/LE weakness       Functional Assessment:   Date Status AE  Comments   Feeding 6/3/24  Set up  Pt sitting upright in w/c in dining room eating of breakfast meal   Grooming 6/3/24  Setup  Pt washed face, applied deodorant and lotion seated upright in w/c at sink         Oral Care 6/3/24 SBA  Pt brushed teeth seated upright in w/c at sink   Bathing 6/3/24  SBA  Showering task seated/standing, pt able to wash of UB, LB, standing with use of grab bar to maintain balance, pt able to wash of buttocks/adria area   UB Dressing 6/3/24  Setup  Pt donned bra and pullover shirt seated upright in w/c   LB Dressing 6/3/24  Mod A  Pt donned underwear and pants, able to thread and stand to pull over hips   Footwear 6/3/24  Mod A  Pt donned socks, assistance to jeniffer tennis shoes and tie   Toileting 24  Mod A     Homemaking   TBA       Functional Transfers / Balance:   Date Status DME  Comments   Sit Balance 6/3/24  Supervision  Pt sat supported upright in w/c and unsupported on shower bench   Stand Balance 6/3/24  SBA/CGA  Pt stood during transfers, ADL's and at tabletop   [] Tub  [x] Shower   Transfer 6/3/24  CGA  SPT from w/c<>shower bench with use of grab bar   Commode   Transfer 24  Mod A     Functional   Mobility 24  Min/mod A     Other:  SPT    Sit to stand    Sit to supine 24 Mod A    Mod A    Min A       Functional Exercises / Activity:  -Arm bike ~5mins x2 standing at table top, with seated rest break, focusing on increasing of standing balance/endurance, activity tolerance, strength and ROM of BUE's  -Theraputty task, removing of 10coins 
Occupational Therapy  OCCUPATIONAL THERAPY DAILY NOTE/DISCHARGE SUMMARY    Date:2024  Patient Name: Rivka Gamboa  MRN: [id number]  : 1955  Room: [address] [place], [place] [address]     Evaluating OT: Francie Rodríguez OTR/L, [id number]  Referring Provider:Ariel Tao MD   Diagnosis: CVA  Additional Pertinent Hx: HTN  Restrictions/Precautions: Fall Risk, possible R inattention, R UE/LE weakness     Functional Assessment:   Date Status AE  Comments   Feeding 6/10/24 Independent     Grooming 6/10/24 Mod I           Oral Care 6/10/24 Mod I     Bathing 6/10/24 SBA/S Seated and standing     UB Dressing 6/10/24 Independent      LB Dressing 6/10/24 SBA/S     Footwear 24 Supervision      Toileting 6/10/24 SBA     Homemaking 24 SBA/CGA Standing       Functional Transfers / Balance:   Date Status DME  Comments   Sit Balance 24 Independent     Stand Balance 24 Mod I No device     [x] Tub  [x] Shower   Transfer 24  SBA/CGA    Min A Ext tub bench   Shower bench    Commode   Transfer 24 Supervision Std commode   Grab bar     Functional   Mobility 24 Supervision No device     Other:  SPT    Sit to stand  Firm recliner, sofa, dining chair with arms and without & queen bed     Supine <> sit       Sit<>Stand 24 Supervision    SBA/CGA                Mod I      Supervision     No device       Functional Exercises / Activity:      Sensory / Neuromuscular Re-Education:      Cognitive Skills:   Status Comments   Problem   Solving Fair +    Memory Fair+    Sequencing Fair+    Safety Fair+      Visual Perception:      Education:      [x] Family teach completed on:24: Pt's daughter present for first 15 minutes of PM tx secondary to needing to leave for work. Educated pt's daughter on current ADL level of assist, recommended level of supervision/assist, homemaking safety and current deficits. Discussed pt's decreased insight into deficits and occasional R 
Physical Therapy  Acute Rehab Evaluation      Evaluating Therapist: Kimmy Schroeder, PT, DPT    [facility]OM[facility]  DIAGNOSIS: Acute CVA   PRECAUTIONS: Fall risk, HTN, R hemiparesis    HPI: Rivka Gamboa is a 70 yo female who presented to ED for evaluation of right-sided weakness and dysarthria that began 19 hours prior to arrival in the ER. She reports to abnormal sensation to the entirety of her right side, heaviness. Upon arrival to ED NIH SS was 5, blood pressure was 197/95. The patient denies any improvement of symptoms since admission. She denies headache, slurred speech, difficulty swallowing, vision change, dizziness or lightheadedness. The patient was not a candidate for TNK or endovascular intervention.        Social:  Pt lives alone in an apartment with 7 steps and one rail to access. Once in the apartment, the home set up is one level.  Prior to admission: Patient was independent and highly ambulatory. She does not own a vehicle, and therefore commutes on foot to the grocery store. She states her daughters live locally and can assist with grocery drop off, or she can utilize [employer] grocery delivery upon discharge.     Initial Evaluation  Date: 06/01/24 AM     Short Term Goals Long Term Goals    Was pt agreeable to Eval/treatment? Yes  See IE      Does pt have pain? Denied        Bed Mobility  Rolling: SBA  Supine to sit: SBA  Sit to supine: SBA  Scooting: SBA  Supervision Independent    Transfers Sit to stand: Min A  Stand to sit: Min A   Stand pivot: Min A with HHA     *see below     SBA to/with AAD   Mod I to/with AAD      Ambulation    150 feet with Min A  with HHA     10mWT: TBD  6mWT: TBD  200 feet with AAD with SBA     300 feet with AAD with SBA    150 feet with AAD Mod I     10mWT: TBD  6mWT: TBD   Walking 10 feet on uneven surface 10 feet with HHA with Min <> Mod A   10 feet with AAD with SBA 10 feet with AAD with Mod I    Wheel Chair Mobility NT       Car Transfers Min A   SBA Independent  
Physical Therapy  Treatment      Evaluating Therapist: Kimmy Schroeder, PT, DPT    [facility]  DIAGNOSIS: Acute CVA   PRECAUTIONS: Fall risk, HTN, R hemiparesis    HPI: Rivka Gamboa is a 68 yo female who presented to ED for evaluation of right-sided weakness and dysarthria that began 19 hours prior to arrival in the ER. She reports to abnormal sensation to the entirety of her right side, heaviness. Upon arrival to ED NIH SS was 5, blood pressure was 197/95. The patient denies any improvement of symptoms since admission. She denies headache, slurred speech, difficulty swallowing, vision change, dizziness or lightheadedness. The patient was not a candidate for TNK or endovascular intervention.        Social:  Pt lives alone in an apartment with 7 steps and one rail to access. Once in the apartment, the home set up is one level.  Prior to admission: Patient was independent and highly ambulatory. She does not own a vehicle, and therefore commutes on foot to the grocery store. She states her daughters live locally and can assist with grocery drop off, or she can utilize [employer] grocery delivery upon discharge.     Initial Evaluation  Date: 06/01/24 AM     PM Short Term Goals Long Term Goals    Was pt agreeable to Eval/treatment? Yes  Yes  yes     Does pt have pain? Denied   No pain noted       Bed Mobility  Rolling: SBA  Supine to sit: SBA  Sit to supine: SBA  Scooting: SBA Rolling: SBA  Supine to sit: SBA  Sit to supine: SBA  Scooting: SBA Rolling: SBA  Supine to sit: SBA  Sit to supine: SBA  Scooting: SBA Supervision Independent    Transfers Sit to stand: Min A  Stand to sit: Min A   Stand pivot: Min A with HHA     *see below    Sit to stand: CGA  Stand to sit: SBA  Stand pivot: Min A with HHA  Sit to stand: CGA  Stand to sit: SBA<>Zeus  Stand pivot: Min A with HHA SBA to/with AAD   Mod I to/with AAD      Ambulation    150 feet with Min A  with HHA     10mWT: TBD  6mWT: ' x 2 with no AD, CGA   150' x 2 
Physical Therapy  Treatment      Evaluating Therapist: Kimmy Schroeder, PT, DPT    [facility]  DIAGNOSIS: Acute CVA   PRECAUTIONS: Fall risk, HTN, R hemiparesis    HPI: Rivka Gamboa is a 70 yo female who presented to ED for evaluation of right-sided weakness and dysarthria that began 19 hours prior to arrival in the ER. She reports to abnormal sensation to the entirety of her right side, heaviness. Upon arrival to ED NIH SS was 5, blood pressure was 197/95. The patient denies any improvement of symptoms since admission. She denies headache, slurred speech, difficulty swallowing, vision change, dizziness or lightheadedness. The patient was not a candidate for TNK or endovascular intervention.        Social:  Pt lives alone in an apartment with 7 steps and one rail to access. Once in the apartment, the home set up is one level.  Prior to admission: Patient was independent and highly ambulatory. She does not own a vehicle, and therefore commutes on foot to the grocery store. She states her daughters live locally and can assist with grocery drop off, or she can utilize [employer] grocery delivery upon discharge.     Initial Evaluation  Date: 06/01/24 AM     PM Short Term Goals Long Term Goals    Was pt agreeable to Eval/treatment? Yes  Yes  yes     Does pt have pain? Denied   Vaginal pain  None      Bed Mobility  Rolling: SBA  Supine to sit: SBA  Sit to supine: SBA  Scooting: SBA Rolling: SBA  Supine to sit: NT  Sit to supine: SBA  Scooting: SBA  (In hospital bed) Rolling: SBA  Supine to sit: SBA  Sit to supine: SBA  Scooting: SBA  (In hospital bed) Supervision Independent    Transfers Sit to stand: Min A  Stand to sit: Min A   Stand pivot: Min A with HHA     *see below    Sit to stand: SBA  Stand to sit: SBA  Stand pivot: CGA no AD  Sit to stand: SBA  Stand to sit: SBA  Stand pivot: SBA no AD SBA to/with AAD   Mod I to/with AAD      Ambulation    150 feet with Min A  with HHA     10mWT: TBD  6mWT: TBD 2x150 feet 
Physical Therapy  Treatment      Evaluating Therapist: Kimmy Schroeder, PT, DPT    [facility]  DIAGNOSIS: Acute CVA   PRECAUTIONS: Fall risk, HTN, R hemiparesis    HPI: Rivka Gamboa is a 70 yo female who presented to ED for evaluation of right-sided weakness and dysarthria that began 19 hours prior to arrival in the ER. She reports to abnormal sensation to the entirety of her right side, heaviness. Upon arrival to ED NIH SS was 5, blood pressure was 197/95. The patient denies any improvement of symptoms since admission. She denies headache, slurred speech, difficulty swallowing, vision change, dizziness or lightheadedness. The patient was not a candidate for TNK or endovascular intervention.        Social:  Pt lives alone in an apartment with 7 steps and one rail to access. Once in the apartment, the home set up is one level.  Prior to admission: Patient was independent and highly ambulatory. She does not own a vehicle, and therefore commutes on foot to the grocery store. She states her daughters live locally and can assist with grocery drop off, or she can utilize [employer] grocery delivery upon discharge.     Initial Evaluation  Date: 06/01/24 AM     PM Short Term Goals Long Term Goals    Was pt agreeable to Eval/treatment? Yes  Yes  yes     Does pt have pain? Denied   No pain noted       Bed Mobility  Rolling: SBA  Supine to sit: SBA  Sit to supine: SBA  Scooting: SBA Rolling: SBA  Supine to sit: SBA  Sit to supine: SBA  Scooting: SBA Rolling: SBA  Supine to sit: SBA  Sit to supine: SBA  Scooting: SBA Supervision Independent    Transfers Sit to stand: Min A  Stand to sit: Min A   Stand pivot: Min A with HHA     *see below    Sit to stand: CGA  Stand to sit: SBA  Stand pivot: Min A with HHA  Sit to stand: CGA  Stand to sit: SBA<>Zeus  Stand pivot: Min A with HHA SBA to/with AAD   Mod I to/with AAD      Ambulation    150 feet with Min A  with HHA     10mWT: TBD  6mWT: ' x 2 with no AD, CGA<>min 
Physical Therapy  Treatment      Evaluating Therapist: Kimmy Schroeder, PT, DPT    [facility]  DIAGNOSIS: Acute CVA   PRECAUTIONS: Fall risk, HTN, R hemiparesis    HPI: Rivka Gamboa is a 70 yo female who presented to ED for evaluation of right-sided weakness and dysarthria that began 19 hours prior to arrival in the ER. She reports to abnormal sensation to the entirety of her right side, heaviness. Upon arrival to ED NIH SS was 5, blood pressure was 197/95. The patient denies any improvement of symptoms since admission. She denies headache, slurred speech, difficulty swallowing, vision change, dizziness or lightheadedness. The patient was not a candidate for TNK or endovascular intervention.        Social:  Pt lives alone in an apartment with 7 steps and one rail to access. Once in the apartment, the home set up is one level.  Prior to admission: Patient was independent and highly ambulatory. She does not own a vehicle, and therefore commutes on foot to the grocery store. She states her daughters live locally and can assist with grocery drop off, or she can utilize [employer] grocery delivery upon discharge.     Initial Evaluation  Date: 06/01/24 AM     PM Short Term Goals Long Term Goals    Was pt agreeable to Eval/treatment? Yes  Yes  yes     Does pt have pain? Denied   None  No pain noted      Bed Mobility  Rolling: SBA  Supine to sit: SBA  Sit to supine: SBA  Scooting: SBA NT Rolling: SBA  Supine to sit: SBA  Sit to supine: SBA  Scooting: SBA Supervision Independent    Transfers Sit to stand: Min A  Stand to sit: Min A   Stand pivot: Min A with HHA     *see below    Sit to stand:CGA  Stand to sit: CGA  Stand pivot: min A no AD  Sit to stand: CGA  Stand to sit:CGA  Stand pivot: CG/SBA no AD SBA to/with AAD   Mod I to/with AAD      Ambulation    150 feet with Min A  with HHA     10mWT: TBD  6mWT:  feet without device with Cga     100 feet without device with min A   150 feet without device with CG/SBA 
Physical Therapy  Treatment      Evaluating Therapist: Kimmy Schroeder, PT, DPT    [facility]  DIAGNOSIS: Acute CVA   PRECAUTIONS: Fall risk, HTN, R hemiparesis    HPI: Rivka Gamboa is a 68 yo female who presented to ED for evaluation of right-sided weakness and dysarthria that began 19 hours prior to arrival in the ER. She reports to abnormal sensation to the entirety of her right side, heaviness. Upon arrival to ED NIH SS was 5, blood pressure was 197/95. The patient denies any improvement of symptoms since admission. She denies headache, slurred speech, difficulty swallowing, vision change, dizziness or lightheadedness. The patient was not a candidate for TNK or endovascular intervention.        Social:  Pt lives alone in an apartment with 7 steps and one rail to access. Once in the apartment, the home set up is one level.  Prior to admission: Patient was independent and highly ambulatory. She does not own a vehicle, and therefore commutes on foot to the grocery store. She states her daughters live locally and can assist with grocery drop off, or she can utilize [employer] grocery delivery upon discharge.     Initial Evaluation  Date: 06/01/24 AM     PM Short Term Goals Long Term Goals    Was pt agreeable to Eval/treatment? Yes  Yes  yes     Does pt have pain? Denied   None  None      Bed Mobility  Rolling: SBA  Supine to sit: SBA  Sit to supine: SBA  Scooting: SBA Rolling: ind   Supine to sit: ind  Sit to supine: ind  Scooting: ind Rolling: ind   Supine to sit: ind  Sit to supine: ind  Scooting: ind Supervision Independent    Transfers Sit to stand: Min A  Stand to sit: Min A   Stand pivot: Min A with HHA     *see below    Sit to stand: ind   Stand to sit: ind   Stand pivot: ind  no AD  Sit to stand: ind   Stand to sit: ind   Stand pivot: ind  no AD  SBA to/with AAD   Mod I to/with AAD      Ambulation    150 feet with Min A  with HHA     10mWT: TBD  6mWT: TBD 2x150 feet without device with ind    10mWT: 
Physical Therapy  Treatment      Evaluating Therapist: Kimmy Schroeder, PT, DPT    [facility]  DIAGNOSIS: Acute CVA   PRECAUTIONS: Fall risk, HTN, R hemiparesis    HPI: Rivka Gamboa is a 70 yo female who presented to ED for evaluation of right-sided weakness and dysarthria that began 19 hours prior to arrival in the ER. She reports to abnormal sensation to the entirety of her right side, heaviness. Upon arrival to ED NIH SS was 5, blood pressure was 197/95. The patient denies any improvement of symptoms since admission. She denies headache, slurred speech, difficulty swallowing, vision change, dizziness or lightheadedness. The patient was not a candidate for TNK or endovascular intervention.        Social:  Pt lives alone in an apartment with 7 steps and one rail to access. Once in the apartment, the home set up is one level.  Prior to admission: Patient was independent and highly ambulatory. She does not own a vehicle, and therefore commutes on foot to the grocery store. She states her daughters live locally and can assist with grocery drop off, or she can utilize [email or web address] grocery delivery upon discharge.     Initial Evaluation  Date: 06/01/24 AM     Short Term Goals Long Term Goals    Was pt agreeable to Eval/treatment? Yes  Yes       Does pt have pain? Denied   No c/o pain      Bed Mobility  Rolling: SBA  Supine to sit: SBA  Sit to supine: SBA  Scooting: SBA Rolling: SBA  Supine to sit: NT  Sit to supine: SBA  Scooting: SBA  (In hospital bed)  Supervision Independent    Transfers Sit to stand: Min A  Stand to sit: Min A   Stand pivot: Min A with HHA     *see below    Sit to stand: SBA  Stand to sit: SBA  Stand pivot: CGA no AD   SBA to/with AAD   Mod I to/with AAD      Ambulation    150 feet with Min A  with HHA     10mWT: TBD  6mWT:  feet without device with CGA/SBA    10mWT: 0.55 m/s  6MWT: 200m   no AD and CGA <> SBA  (6/9/24)    200 feet with AAD with SBA     300 feet with AAD with SBA    150 
Physical Therapy  Treatment      Evaluating Therapist: Kimmy Schroeder, PT, DPT    [facility]  DIAGNOSIS: Acute CVA   PRECAUTIONS: Fall risk, HTN, R hemiparesis    HPI: Rivka Gamboa is a 70 yo female who presented to ED for evaluation of right-sided weakness and dysarthria that began 19 hours prior to arrival in the ER. She reports to abnormal sensation to the entirety of her right side, heaviness. Upon arrival to ED NIH SS was 5, blood pressure was 197/95. The patient denies any improvement of symptoms since admission. She denies headache, slurred speech, difficulty swallowing, vision change, dizziness or lightheadedness. The patient was not a candidate for TNK or endovascular intervention.        Social:  Pt lives alone in an apartment with 7 steps and one rail to access. Once in the apartment, the home set up is one level.  Prior to admission: Patient was independent and highly ambulatory. She does not own a vehicle, and therefore commutes on foot to the grocery store. She states her daughters live locally and can assist with grocery drop off, or she can utilize [employer] grocery delivery upon discharge.     Initial Evaluation  Date: 06/01/24 AM     PM Short Term Goals Long Term Goals    Was pt agreeable to Eval/treatment? Yes  Yes  yes     Does pt have pain? Denied   No pain noted  No pain noted      Bed Mobility  Rolling: SBA  Supine to sit: SBA  Sit to supine: SBA  Scooting: SBA Rolling: SBA  Supine to sit: SBA  Sit to supine: SBA  Scooting: SBA Rolling: SBA  Supine to sit: SBA  Sit to supine: SBA  Scooting: SBA Supervision Independent    Transfers Sit to stand: Min A  Stand to sit: Min A   Stand pivot: Min A with HHA     *see below    Sit to stand: CGA  Stand to sit: SBA  Stand pivot: CGA no AD  Sit to stand: CGA  Stand to sit: SBA  Stand pivot: CGA no AD SBA to/with AAD   Mod I to/with AAD      Ambulation    150 feet with Min A  with HHA     10mWT: TBD  6mWT: ' x 2 with no AD, CGA   150' with 
Physical Therapy  Weekly Note       Evaluating Therapist: Kimmy Schroeder, PT, DPT    [facility]  DIAGNOSIS: Acute CVA   PRECAUTIONS: Fall risk, HTN, R hemiparesis    HPI: Rivka Gamboa is a 68 yo female who presented to ED for evaluation of right-sided weakness and dysarthria that began 19 hours prior to arrival in the ER. She reports to abnormal sensation to the entirety of her right side, heaviness. Upon arrival to ED NIH SS was 5, blood pressure was 197/95. The patient denies any improvement of symptoms since admission. She denies headache, slurred speech, difficulty swallowing, vision change, dizziness or lightheadedness. The patient was not a candidate for TNK or endovascular intervention.        Social:  Pt lives alone in an apartment with 7 steps and one rail to access. Once in the apartment, the home set up is one level.  Prior to admission: Patient was independent and highly ambulatory. She does not own a vehicle, and therefore commutes on foot to the grocery store. She states her daughters live locally and can assist with grocery drop off, or she can utilize [employer] grocery delivery upon discharge.     Initial Evaluation  Date: 06/01/24 6/3  6/10 Comments  Short Term Goals Long Term Goals    Was pt agreeable to Eval/treatment? Yes  yes yes      Does pt have pain? Denied   None noted        Bed Mobility  Rolling: SBA  Supine to sit: SBA  Sit to supine: SBA  Scooting: SBA Rolling: SBA  Supine to sit: SBA  Sit to supine: SBA  Scooting: SBA Rolling: SBA  Supine to sit: SBA  Sit to supine: SBA  Scooting: SBA  Supervision Independent    Transfers Sit to stand: Min A  Stand to sit: Min A   Stand pivot: Min A with HHA     *see below    Sit to stand: CGA  Stand to sit: SBA<>Zeus  Stand pivot: Min A with HHA Sit to stand: SBA  Stand to sit: SBA  Stand pivot: SBA no AD Poor safety with sit transfers  SBA to/with AAD   Mod I to/with AAD      Ambulation    150 feet with Min A  with HHA     10mWT: TBD  6mWT: 
Physical Therapy  Weekly Note       Evaluating Therapist: Kimmy Schroeder, PT, DPT    [facility]  DIAGNOSIS: Acute CVA   PRECAUTIONS: Fall risk, HTN, R hemiparesis    HPI: Rivka Gamboa is a 70 yo female who presented to ED for evaluation of right-sided weakness and dysarthria that began 19 hours prior to arrival in the ER. She reports to abnormal sensation to the entirety of her right side, heaviness. Upon arrival to ED NIH SS was 5, blood pressure was 197/95. The patient denies any improvement of symptoms since admission. She denies headache, slurred speech, difficulty swallowing, vision change, dizziness or lightheadedness. The patient was not a candidate for TNK or endovascular intervention.        Social:  Pt lives alone in an apartment with 7 steps and one rail to access. Once in the apartment, the home set up is one level.  Prior to admission: Patient was independent and highly ambulatory. She does not own a vehicle, and therefore commutes on foot to the grocery store. She states her daughters live locally and can assist with grocery drop off, or she can utilize [employer] grocery delivery upon discharge.     Initial Evaluation  Date: 06/01/24 6/3  Comments  Short Term Goals Long Term Goals    Was pt agreeable to Eval/treatment? Yes  yes      Does pt have pain? Denied   None noted       Bed Mobility  Rolling: SBA  Supine to sit: SBA  Sit to supine: SBA  Scooting: SBA Rolling: SBA  Supine to sit: SBA  Sit to supine: SBA  Scooting: SBA  Supervision Independent    Transfers Sit to stand: Min A  Stand to sit: Min A   Stand pivot: Min A with HHA     *see below    Sit to stand: CGA  Stand to sit: SBA<>Zeus  Stand pivot: Min A with HHA Poor safety with pivots/stand to sit transfers  SBA to/with AAD   Mod I to/with AAD      Ambulation    150 feet with Min A  with HHA     10mWT: TBD  6mWT: ' x 2 with no AD, CGA<>min A     150' w/ FWW CGA Pt with improved stability with FWW but prefers no AD  200 feet with 
Progress Note  Date:2024       Room:[id number]  Patient Name:Rivka Gamboa     YOB: 1955     Age:69 y.o.        Subjective    Subjective:  Symptoms:  Stable.  She reports weakness.    Diet:  Adequate intake.    Activity level: Impaired due to weakness.    Pain:  She reports no pain.       Review of Systems   Neurological:  Positive for weakness.     Objective         Vitals Last 24 Hours:  TEMPERATURE:  Temp  Av.7 °F (36.5 °C)  Min: 97.6 °F (36.4 °C)  Max: 97.7 °F (36.5 °C)  RESPIRATIONS RANGE: Resp  Av.5  Min: 17  Max: 18  PULSE OXIMETRY RANGE: SpO2  Av %  Min: 100 %  Max: 100 %  PULSE RANGE: Pulse  Av  Min: 71  Max: 86  BLOOD PRESSURE RANGE: Systolic (24hrs), Av , Min:118 , Max:170   ; Diastolic (24hrs), Av, Min:66, Max:83    I/O (24Hr):    Intake/Output Summary (Last 24 hours) at 2024 0756  Last data filed at 6/3/2024 0906  Gross per 24 hour   Intake 240 ml   Output --   Net 240 ml     Objective:  General Appearance:  In no acute distress.    Vital signs: (most recent): Blood pressure (!) 149/66, pulse 71, temperature 97.7 °F (36.5 °C), temperature source Temporal, resp. rate 17, height 1.702 m (5' 7\"), weight 83.5 kg (184 lb), SpO2 100 %.  Vital signs are normal.    Output: Producing urine and producing stool.    Lungs:  Normal effort and normal respiratory rate.  Breath sounds clear to auscultation.    Heart: Normal rate.  Regular rhythm.  S1 normal and S2 normal.    Abdomen: Abdomen is soft.  Bowel sounds are normal.   There is no abdominal tenderness.     Extremities: Normal range of motion.    Neurological: Patient is alert.  (4/5 strength).      Labs/Imaging/Diagnostics    Labs:  CBC:No results for input(s): \"WBC\", \"RBC\", \"HGB\", \"HCT\", \"MCV\", \"RDW\", \"PLT\" in the last 72 hours.  CHEMISTRIES:No results for input(s): \"NA\", \"K\", \"CL\", \"CO2\", \"BUN\", \"CREATININE\", \"GLUCOSE\", \"PHOS\", \"MG\" in the last 72 hours.    Invalid input(s): \"CA\"  PT/INR:No results for 
Progress Note  Date:2024       Room[facility]  Patient Name:Rivka Gamboa     YOB: 1955     Age:69 y.o.        Subjective    Subjective:  Symptoms:  Stable.  She reports weakness.    Diet:  Adequate intake.    Activity level: Impaired due to weakness.    Pain:  She reports no pain.       Review of Systems   Neurological:  Positive for weakness.     Objective         Vitals Last 24 Hours:  TEMPERATURE:  Temp  Av.3 °F (36.3 °C)  Min: 97.3 °F (36.3 °C)  Max: 97.3 °F (36.3 °C)  RESPIRATIONS RANGE: Resp  Av  Min: 16  Max: 16  PULSE OXIMETRY RANGE: SpO2  Av %  Min: 96 %  Max: 96 %  PULSE RANGE: Pulse  Av  Min: 67  Max: 67  BLOOD PRESSURE RANGE: Systolic (24hrs), Av , Min:126 , Max:126   ; Diastolic (24hrs), Av, Min:62, Max:62    I/O (24Hr):    Intake/Output Summary (Last 24 hours) at 2024 0800  Last data filed at 6/10/2024 1335  Gross per 24 hour   Intake 520 ml   Output --   Net 520 ml     Objective:  General Appearance:  In no acute distress.    Vital signs: (most recent): Blood pressure 126/62, pulse 67, temperature 97.3 °F (36.3 °C), temperature source Temporal, resp. rate 16, height 1.702 m (5' 7\"), weight 83.5 kg (184 lb), SpO2 96 %.  Vital signs are normal.    Output: Producing urine and producing stool.    Lungs:  Normal effort and normal respiratory rate.  Breath sounds clear to auscultation.    Heart: Normal rate.  Regular rhythm.  S1 normal and S2 normal.    Abdomen: Abdomen is soft.  Bowel sounds are normal.   There is no abdominal tenderness.     Extremities: Normal range of motion.    Neurological: Patient is alert.  (4/5 strength).      Labs/Imaging/Diagnostics    Labs:  CBC:No results for input(s): \"WBC\", \"RBC\", \"HGB\", \"HCT\", \"MCV\", \"RDW\", \"PLT\" in the last 72 hours.  CHEMISTRIES:No results for input(s): \"NA\", \"K\", \"CL\", \"CO2\", \"BUN\", \"CREATININE\", \"GLUCOSE\", \"PHOS\", \"MG\" in the last 72 hours.    Invalid input(s): \"CA\"  PT/INR:No results for input(s): 
Progress Note  Date:2024       [facility]  Patient Name:Rivka Gamboa     YOB: 1955     Age:69 y.o.        Subjective    Subjective:  Symptoms:  Stable.  She reports weakness.    Diet:  Adequate intake.    Activity level: Impaired due to weakness.    Pain:  She reports no pain.       Review of Systems   Neurological:  Positive for weakness.     Objective         Vitals Last 24 Hours:  TEMPERATURE:  Temp  Av °F (36.7 °C)  Min: 97.6 °F (36.4 °C)  Max: 98.3 °F (36.8 °C)  RESPIRATIONS RANGE: Resp  Av  Min: 18  Max: 18  PULSE OXIMETRY RANGE: SpO2  Av.5 %  Min: 98 %  Max: 99 %  PULSE RANGE: Pulse  Av  Min: 71  Max: 79  BLOOD PRESSURE RANGE: Systolic (24hrs), Av , Min:141 , Max:151   ; Diastolic (24hrs), Av, Min:65, Max:72    I/O (24Hr):    Intake/Output Summary (Last 24 hours) at 2024 0823  Last data filed at 2024 1402  Gross per 24 hour   Intake 240 ml   Output --   Net 240 ml     Objective:  General Appearance:  In no acute distress.    Vital signs: (most recent): Blood pressure (!) 151/65, pulse 79, temperature 98.3 °F (36.8 °C), temperature source Temporal, resp. rate 18, height 1.702 m (5' 7\"), weight 83.5 kg (184 lb), SpO2 98 %.  Vital signs are normal.    Output: Producing urine and producing stool.    Lungs:  Normal effort and normal respiratory rate.  Breath sounds clear to auscultation.    Heart: Normal rate.  Regular rhythm.  S1 normal and S2 normal.    Abdomen: Abdomen is soft.  Bowel sounds are normal.   There is no abdominal tenderness.     Extremities: Normal range of motion.    Neurological: Patient is alert.  (4/5 right side).      Labs/Imaging/Diagnostics    Labs:  CBC:No results for input(s): \"WBC\", \"RBC\", \"HGB\", \"HCT\", \"MCV\", \"RDW\", \"PLT\" in the last 72 hours.  CHEMISTRIES:No results for input(s): \"NA\", \"K\", \"CL\", \"CO2\", \"BUN\", \"CREATININE\", \"GLUCOSE\", \"PHOS\", \"MG\" in the last 72 hours.    Invalid input(s): \"CA\"  PT/INR:No results for 
Progress Note  Date:2024       [id number]  Patient Name:Rivka Gamboa     YOB: 1955     Age:69 y.o.        Subjective    Subjective:  Symptoms:  Stable.  She reports weakness.    Diet:  Adequate intake.    Activity level: Impaired due to weakness.    Pain:  She reports no pain.       Review of Systems   Neurological:  Positive for weakness.     Objective         Vitals Last 24 Hours:  TEMPERATURE:  Temp  Av.4 °F (36.9 °C)  Min: 98.3 °F (36.8 °C)  Max: 98.4 °F (36.9 °C)  RESPIRATIONS RANGE: Resp  Av  Min: 18  Max: 18  PULSE OXIMETRY RANGE: SpO2  Av %  Min: 94 %  Max: 100 %  PULSE RANGE: Pulse  Av.5  Min: 85  Max: 86  BLOOD PRESSURE RANGE: Systolic (24hrs), Av , Min:161 , Max:161   ; Diastolic (24hrs), Av, Min:75, Max:82    I/O (24Hr):    Intake/Output Summary (Last 24 hours) at 2024 0823  Last data filed at 2024 2041  Gross per 24 hour   Intake 360 ml   Output --   Net 360 ml     Objective:  General Appearance:  In no acute distress.    Vital signs: (most recent): Blood pressure (!) 161/75, pulse 85, temperature 98.4 °F (36.9 °C), temperature source Temporal, resp. rate 18, height 1.702 m (5' 7\"), weight 83.5 kg (184 lb), SpO2 100 %.  (Elevated blood pressure 1 40-1 60 systolic).    Output: Producing urine and producing stool.    Lungs:  Normal effort and normal respiratory rate.  Breath sounds clear to auscultation.    Heart: Normal rate.  Regular rhythm.  S1 normal and S2 normal.    Abdomen: Abdomen is soft.  Bowel sounds are normal.   There is no abdominal tenderness.     Extremities: Normal range of motion.  (Left wrist pain)  Neurological: Patient is alert.  (4/5 left side).      Labs/Imaging/Diagnostics    Labs:  CBC:No results for input(s): \"WBC\", \"RBC\", \"HGB\", \"HCT\", \"MCV\", \"RDW\", \"PLT\" in the last 72 hours.  CHEMISTRIES:No results for input(s): \"NA\", \"K\", \"CL\", \"CO2\", \"BUN\", \"CREATININE\", \"GLUCOSE\", \"PHOS\", \"MG\" in the last 72 hours.    Invalid 
Progress Note  Date:6/3/2024       [facility]  Patient Name:Rivka Gamboa     YOB: 1955     Age:69 y.o.        Subjective    Subjective:  Symptoms:  Stable.  She reports weakness.    Diet:  Adequate intake.    Activity level: Impaired due to weakness.    Pain:  (Patient had some vaginal or leg pain last night and it has resolved).      Review of Systems   Neurological:  Positive for weakness.     Objective         Vitals Last 24 Hours:  TEMPERATURE:  Temp  Av.9 °F (36.6 °C)  Min: 97 °F (36.1 °C)  Max: 98.8 °F (37.1 °C)  RESPIRATIONS RANGE: Resp  Av  Min: 16  Max: 18  PULSE OXIMETRY RANGE: SpO2  Av %  Min: 96 %  Max: 96 %  PULSE RANGE: Pulse  Av.5  Min: 77  Max: 78  BLOOD PRESSURE RANGE: Systolic (24hrs), Av , Min:133 , Max:142   ; Diastolic (24hrs), Av, Min:76, Max:77    I/O (24Hr):    Intake/Output Summary (Last 24 hours) at 6/3/2024 0817  Last data filed at 2024 1200  Gross per 24 hour   Intake 120 ml   Output --   Net 120 ml     Objective:  General Appearance:  In no acute distress.    Vital signs: (most recent): Blood pressure (!) 142/77, pulse 77, temperature 98.8 °F (37.1 °C), temperature source Oral, resp. rate 16, height 1.702 m (5' 7\"), weight 83.5 kg (184 lb), SpO2 96 %.  Vital signs are normal.    Output: Producing urine and producing stool.    Lungs:  Normal effort and normal respiratory rate.  Breath sounds clear to auscultation.    Heart: Normal rate.  Regular rhythm.  S1 normal and S2 normal.    Abdomen: Abdomen is soft.  Bowel sounds are normal.   There is no abdominal tenderness.     Extremities: Normal range of motion.    Neurological: Patient is alert.  (4/5 strength).      Labs/Imaging/Diagnostics    Labs:  CBC:  Recent Labs     24  0600   WBC 7.6   RBC 5.27   HGB 15.5   HCT 48.7*   MCV 92.4   RDW 14.6        CHEMISTRIES:  Recent Labs     24  0600      K 4.3   CL 98   CO2 23   BUN 30*   CREATININE 1.6*   GLUCOSE 90 
Pt admitted to 5507a.  Vitals recorded.  Pt with no complaints at time of admission.  Family present.  Provider alerted to presence on unit.    
SPEECH LANGUAGE PATHOLOGY  DAILY PROGRESS NOTE        PATIENT NAME:  Rivka Gamboa      :  1955          TODAY'S DATE:  2024 ROOM:  [facility]        FIMS SCORES                            Swallowing                          Current Status            7--Independent                   Short Term Goal         7--Independent               Long Term Goal          7--Independent                Receptive                          Current Status             6--Modified Independent                       Short Term Goal         6--Modified Independent                       Long Term Goal          7--Independent                Expressive                          Current Status            7--Independent              Short Term Goal          7--Independent                         Long Term Goal          7--Independent                                                                           Problem Solving                          Current Status           4--Minimal Assistance               Short Term Goal         5--Supervision               Long Term Goal          6--Modified Independent                          Memory                          Current Status            5--Supervision               Short Term Goal         6--Modified Independent                       Long Term Goal          7--Independent                Social Interaction              Current Status            6--Modified Independent                 Short Term Goal         6--Modified Independent            Long Term Goal          6--Modified Independent               FIMS updated in team meeting this date.         Lizz Lou M.S., CCC-SLP  Speech-Language Pathologist  SP. [id number]      
SPEECH/LANGUAGE PATHOLOGY  CLINICAL ASSESSMENT OF SWALLOWING FUNCTION   and PLAN OF CARE    PATIENT NAME:  Rivka Gamboa  (female)     MRN:  [id number]    :  1955  (69 y.o.)  STATUS:  Inpatient: Room 5507/5507-A    TODAY'S DATE:  2024  REFERRING PROVIDER: Dr. Tao  SPECIFIC PROVIDER ORDER: Speech language pathology evaluation Date of order:  24  REASON FOR REFERRAL: CVA   EVALUATING THERAPIST: LIZBET Quinn                 RESULTS:    DYSPHAGIA DIAGNOSIS:   Clinical indicators of minimal-mild oropharyngeal phase dysphagia       DIET RECOMMENDATIONS:  Soft and bite size consistency solids (IDDSI level 6) with  thin liquids (IDDSI level 0)     FEEDING RECOMMENDATIONS:     Assistance level:  Supervision is needed during all oral intake      Compensatory strategies recommended: Thorough oral care to prevent colonization of oral bacteria   Fully alert for all PO  Slow rate of intake   NO STRAW      Discussed recommendations with:  patient nurse in person    SPEECH THERAPY  PLAN OF CARE   The dysphagia POC is established based on physician order, dysphagia diagnosis and results of clinical assessment     Skilled SLP intervention for dysphagia management on acute care up to 5 x per week until goals met, pt plateaus in function and/or discharged from hospital    Conditions Requiring Skilled Therapeutic Intervention for dysphagia:     Impaired mastication  Coughing during PO intake      Specific dysphagia interventions to include:     Compensatory strategy training     Specific instructions for next treatment:  development and training of compensatory swallow strategies to improve airway protection and swallow function and ongoing PO analysis to upgrade diet and evaluate tolerance of current PO recommendation  Patient Treatment Goals:    Short Term Goals:  Pt will participate in ongoing mealtime assessment to provide diet modification and compensatory strategy implementation to minimize risk of aspiration 
SPEECH/LANGUAGE PATHOLOGY  SPEECH/LANGUAGE/COGNITIVE EVALUATION      PATIENT NAME:  Rivka Gamboa  (female)     MRN:  [id number]    :  1955  (69 y.o.)  STATUS:  Inpatient: Room 5507/5507-A    TODAY'S DATE:  2024  REFERRING PROVIDER:  Dr. Tao   SPECIFIC PROVIDER ORDER: Speech language pathology evaluation  Date of order:  24  REASON FOR REFERRAL: CVA  EVALUATING THERAPIST: Vidhya Martinez SLP    ADMITTING DIAGNOSIS: Acute cerebrovascular accident (CVA) (HCC) [I63.9]    VISIT DIAGNOSIS:      SPEECH THERAPY  PLAN OF CARE   The speech therapy  POC is established based on physician order, speech pathology diagnosis and results of clinical assessment     SPEECH PATHOLOGY DIAGNOSIS:     Mild Cognitive Deficits, Minimal to Mild Dysarthria      Speech Pathology intervention is recommended up to 6 times per week for LOS or when goals are met with emphasis on the following:      Conditions Requiring Skilled Therapeutic Intervention for speech, language and/or cognition    Cognitive linguistic impairment    Specific Speech Therapy Interventions to Include:   Therapeutic tasks for Cognition  Therapeutic exercises for dysarthria    Specific instructions for next treatment:     To initiate memory tasks  To initiate thought organization tasks  To initiate speech production tasks    SHORT/LONG TERM GOALS  Pt will improve immediate, short term, recent memory during structured and unstructured tasks with 80% accuracy   Pt will improve problem solving/thought organization during structured and unstructured tasks with 80% accuracy   Pt will improve receptive and expressive language skills with adequate thought content, organization, and processing time to facilitate improved communication with minimal.  Pt will improve speech intelligibility and increased articulatory precision via compensatory strategies and oral motor exercises     Patient stated goals: Agreed with above,     Rehabilitation Potential/Prognosis: good 
Speech Language Pathology  ACUTE REHABILITATION--DAILY PROGRESS NOTE            SWALLOWING:      Diet:    Regular consistency solids (IDDSI level 7) with thin liquids (IDDSI level 0)    Patient seen for dysphagia tx during lunch meal. Patient had been previously upgraded to regular solids with thin liquids. Patient noted to be taking large bites and benefited from v/c to decrease overall rate. Patient also benefited from v/c to not talk while attempting to masticate and swallow.      LANGUAGE:    Benefits from occasional clarification and increased time     COGNITION:      Patient finishing ADL with OT at beginning of session. Patient noted to have fair- recall of safety needed throughout room (I.e. did not lock brakes prior to standing out of wheelchair). Patient demonstrated fair+ sequencing to don clothing.     Patient seen in therapy space for skilled cognitive tx. Pt completed medication management task in which Pt was asked to find appropriate medication bottles (from written list) and compare/ contrast to bottles presented, count out dosages (AM vs PM, etc) , and sort the simulated pills (plastic beads) into a medication organizer. Pt completed the task with fair+ outcome and min cues from SLP.  Patient required cues to recheck organization and find errors.     SPEECH:    Minimal/mild dysarthria. Reviewed intelligibility strategies with patient.      Minute Tracking:    Individual minutes:   30 minutes  Concurrent minutes:    15 minutes  Co-treatment minutes:   15 minutes    Total minutes for 6/5/2024: 60 minutes      SAFETY:      Fair per OT    EDUCATION:    Patient educated regarding ST POC    OUTCOME:    Progress made towards goals. Will continue SP intervention as per previously established POC.    SP recommended after discharge:  TBD  Supervision recommended at discharge: TBD      FIMS SCORES                            Swallowing                          Current Status             5--Supervision          
Speech Language Pathology  ACUTE REHABILITATION--DAILY PROGRESS NOTE            SWALLOWING:      Diet:    Regular consistency solids (IDDSI level 7) with thin liquids (IDDSI level 0)    Patient seen for dysphagia tx during lunch meal. Patient received a regular texture tray with thin liquids via cup. Patient oral stage exhibited min oral stasis after 1st swl that cleared w/ 2nd swl and/or liquid wash, fair bolus formation;manipulation, adequate mastication time, and fair AP tongue propulsion. Patient pharyngeal stage exhibited no overt clinical indicators of pen/aspiration. Noted improvement with use of small bites and slow rate.      LANGUAGE:    Not targeted this session.      COGNITION:      Not targeted this session    SPEECH:    Minimal/mild dysarthria. Reviewed intelligibility strategies with patient.      Minute Tracking:    Individual minutes:     0 minutes  Concurrent minutes:    15 minutes  Co-treatment minutes:    0 minutes    Total minutes for 6/6/2024: 15 minutes + additional session      SAFETY:      Fair per OT    EDUCATION:    Patient educated regarding ST POC    OUTCOME:    Progress made towards goals. Will continue SP intervention as per previously established POC.    SP recommended after discharge:  TBD  Supervision recommended at discharge: TBD      FIMS SCORES                            Swallowing                          Current Status             5--Supervision               Short Term Goal         6--Modified Independent                       Long Term Goal          7--Independent                Receptive                          Current Status             6--Modified Independent                       Short Term Goal         6--Modified Independent                       Long Term Goal          7--Independent                Expressive                          Current Status             5--Supervision               Short Term Goal         6--Modified Independent                       Long Term 
Speech Language Pathology  ACUTE REHABILITATION--DAILY PROGRESS NOTE            SWALLOWING:      Diet:    Regular consistency solids (IDDSI level 7) with thin liquids (IDDSI level 0)    Patient seen for dysphagia tx in room, during lunch meal. Patient received a regular texture tray with thin liquids via cup. Patient oral stage exhibited min oral stasis after 1st swl that cleared w/ 2nd swl and/or liquid wash, fair bolus formation;manipulation, adequate mastication time, and fair AP tongue propulsion. Patient pharyngeal stage exhibited no overt clinical indicators of pen/aspiration. Noted improvement with use of small bites and slow rate.All other swallow strategies utilized independently. Patient no longer requires supervision during meals. All dysphagia goals met at this time. Continue with recommended diet. RN updated in person.     LANGUAGE:    Not targeted this session.      COGNITION:      Patient seen in therapy space for skilled cognitive tx. Patient alert and oriented to all concepts. Patient able to recall rules of card game with fair accuracy and min verbal cues required to increase attention to detail. Patient able to sequence cards into correct order per rules of the game with 90% acc w/ occ verbal cues required. Patient able to complete problem solving related to card game with 90% acc w/ occ verbal cues required.     SPEECH:    Minimal/mild dysarthria. Reviewed intelligibility strategies with patient.      Minute Tracking:    Individual minutes:     15 minutes  Concurrent minutes:     0 minutes  Co-treatment minutes:    30 minutes    Total minutes for 6/7/2024: 45 minutes      SAFETY:      Fair per OT    EDUCATION:    Patient educated regarding ST POC    OUTCOME:    Progress made towards goals. Will continue SP intervention as per previously established POC.    SP recommended after discharge:  TBD  Supervision recommended at discharge: TBD      FIMS SCORES                            Swallowing         
Speech Language Pathology  ACUTE REHABILITATION--DAILY PROGRESS NOTE            SWALLOWING:      Diet:    Regular consistency solids (IDDSI level 7) with thin liquids (IDDSI level 0)    Patient seen in dining area for skilled dysphagia tx. Patient received a trial tray of regular solids and thin liquids. Patient oral stage exhibited min oral stasis after 1st swl that cleared w/ 2nd swl and/or liquid wash, fair bolus formation;manipulation, adequate mastication time, and fair AP tongue propulsion. Patient pharyngeal stage exhibited no overt clinical indicators of pen/aspiration. SLP recommends diet upgrade to regular textures solids and continue with thin liquids.  RN updated in person. SLP recommends f/u 1 additional session in dining room to assess toleration and independent use of compensatory swl strategies.      LANGUAGE:    Benefits from occasional clarification and increased time     COGNITION:      Patient seen in therapy space for skilled cognitive tx. Patient was alert and oriented to all concepts. Patient completed STM/recall task assoc w/ daily events thus far with fair accuracy. Patient able to complete problem solving tasks assoc w/ med mgmt with 90% acc w/ occ verbal cues required to formulate appropriate solutions. Patient able to complete 4 step sequencing tasks assoc w/ADLs and IADLs with 96% acc, independently.     SPEECH:    Minimal/mild dysarthria. Reviewed intelligibility strategies with patient.      Minute Tracking:    Individual minutes:   45 minutes  Concurrent minutes:    15 minutes  Co-treatment minutes:   0 minutes    Total minutes for 6/4/2024: 60 minutes      SAFETY:      Fair per OT    EDUCATION:    Patient educated regarding ST POC    OUTCOME:    Progress made towards goals. Will continue SP intervention as per previously established POC.    SP recommended after discharge:  TBD  Supervision recommended at discharge: TBD      FIMS SCORES                            Swallowing            
Speech Language Pathology  ACUTE REHABILITATION--DAILY PROGRESS NOTE            SWALLOWING:      Diet:    Regular consistency solids (IDDSI level 7) with thin liquids (IDDSI level 0)    Per 6/7 note: Patient no longer requires supervision during meals. All dysphagia goals met at this time. Continue with recommended diet. RN updated in person.     LANGUAGE:    Not targeted this session.      COGNITION:      Patient seen in room for skilled cognitive tx. Patient alert and oriented to all concepts. Pt reported being tired this AM but was cooperative for tx.     To improve problem solving and reasoning skills, pt complete categorical naming task (e.g., such as a color that starts w/ \"b\") with 78% accuracy given ext time and min to mod verbal/visual cues. SLP educated pt re: strategies such as taking time, using process of elimination, etc to assist with completion. Pt demo'd fair/fair+ recall of categories during task. Pt left in room w/ callbell w/I reach following session.       SPEECH:    Minimal dysarthria noted this session.       Minute Tracking:    Individual minutes:     30 minutes  Concurrent minutes:     0 minutes  Co-treatment minutes:    0 minutes    Total minutes for 6/8/2024: 30 minutes      SAFETY:      Fair per OT    EDUCATION:    Patient educated regarding ST POC    OUTCOME:    Progress made towards goals. Will continue SP intervention as per previously established POC.    SP recommended after discharge:  TBD  Supervision recommended at discharge: TBD      FIMS SCORES                            Swallowing                          Current Status             5--Supervision               Short Term Goal         6--Modified Independent                       Long Term Goal          7--Independent                Receptive                          Current Status             6--Modified Independent                       Short Term Goal         6--Modified Independent                       Long Term Goal         
Speech Language Pathology  ACUTE REHABILITATION--DAILY PROGRESS NOTE            SWALLOWING:      Diet:    Regular consistency solids (IDDSI level 7) with thin liquids (IDDSI level 0)    Per 6/7 note: Patient no longer requires supervision during meals. All dysphagia goals met at this time. Continue with recommended diet. RN updated in person.     LANGUAGE:    Not targeted this session.      COGNITION:      Patient seen in therapy space for skilled cognitive tx. Patient alert and oriented to all concepts. Patient completed visual memory match task of mod complexity w/ 83% acc w/ occ verbal cues required. Patient completed reasoning task in which patient had to organize concepts into a specific order with 92% acc w/ occ verbal cues required. Patient completed task in which he had to read everyday things and identify specific information. Patient completed task with 93% acc w/ occ verbal cues required to increase attention to detail.       SPEECH:    Minimal dysarthria noted this session.       Minute Tracking:    Individual minutes:      0 minutes  Concurrent minutes:     0 minutes  Co-treatment minutes:    45 minutes    Total minutes for 6/10/2024: 45 minutes      SAFETY:      Fair per OT    EDUCATION:    Patient educated regarding ST POC    OUTCOME:    Progress made towards goals. Will continue SP intervention as per previously established POC.    SP recommended after discharge:  TBD  Supervision recommended at discharge: TBD      FIMS SCORES                            Swallowing                          Current Status             5--Supervision               Short Term Goal         6--Modified Independent                       Long Term Goal          7--Independent                Receptive                          Current Status             6--Modified Independent                       Short Term Goal         6--Modified Independent                       Long Term Goal          7--Independent              
Speech Language Pathology  ACUTE REHABILITATION--DAILY PROGRESS NOTE            SWALLOWING:      Diet:  Regular consistency solids (IDDSI level 7) with thin liquids (IDDSI level 0)    Patient had been previously upgraded to regular solids with thin liquids.     LANGUAGE:    Benefits from occasional clarification and increased time. Patient was observed to be of flat affect with decreased eye contact. She provided minimal response during spontaneous conversation with SLP.     COGNITION:      Patient seen in therapy space for skilled cognitive tx targeting flexible thinking, money management, deductive reasoning, attention and organization skills. She was given a grocery list and asked to make decisions based on price point to stay within a budget. Patient initially ignored task instructions and make choices based on her personal preferences. She was unresponsive to prompting, indicating the task is not relevant to her life. Patient was provided on education for the purpose of the task and the benefits. When adding, patient initially had difficulty with attention, not hitting the \"+\" button prior to each entry. Patient appeared to forget which item on the list she already added, requiring moderate verbal and gestural prompts to attend. Patient was responsive to cueing and patient began to self-correct her errors as the activity progressed. While she was given a calculator, patient was encouraged to use phone calculator which she was initially resistant to. Patient independently used the stylus to operate the calculator.      SPEECH:    Minimal/mild dysarthria. Reviewed intelligibility strategies with patient.       Minute Tracking:    Individual minutes:      45 minutes  Concurrent minutes:     0 minutes  Co-treatment minutes:    0 minutes    Total minutes for 6/6/2024: 45 minutes      SAFETY:      Upon SLP arrival to pt room, she stood up when the bed alarm was not on, indicating lack of safety awareness and insight. 
Speech Language Pathology  ACUTE REHABILITATION--DISCHARGE NOTE            FIMS SCORES                              Swallowing                          Current Status            7--Independent                   Short Term Goal         7--Independent               Long Term Goal          7--Independent                Receptive                          Current Status             6--Modified Independent                       Short Term Goal         6--Modified Independent                       Long Term Goal          7--Independent                Expressive                          Current Status            7--Independent              Short Term Goal          7--Independent                         Long Term Goal          7--Independent                                                                           Problem Solving                          Current Status           4--Minimal Assistance               Short Term Goal         5--Supervision               Long Term Goal          6--Modified Independent                          Memory                          Current Status            5--Supervision               Short Term Goal         6--Modified Independent                       Long Term Goal          7--Independent                Social Interaction              Current Status            6--Modified Independent                 Short Term Goal         6--Modified Independent            Long Term Goal          6--Modified Independent                        DISCHARGE DIET RECOMMENDATIONS   Regular consistency solids (IDDSI level 7) with thin liquids (IDDSI level 0)     Per 6/7 note: Patient no longer requires supervision during meals. All dysphagia goals met at this time. Continue with recommended diet.         EDUCATION    Patient educated regarding ST POC     Supervision recommended at discharge: 24 hour    Fair progress towards established goals.    SP recommended after discharge:  Yes        
  Walking 10 feet on uneven surface 10 feet with HHA with Min <> Mod A  10' no AD sup (x2 reps) 10 feet with AAD with SBA 10 feet with AAD with Mod I    Wheel Chair Mobility NT  NT     Car Transfers Min A  sup SBA Independent    Stair negotiation: ascended and descended  8 steps with bilateral rail with Min A  12 steps with unilateral rail with Sup  (Reciprocal pattern ascending and descending)  8 steps with unilateral rail with SBA 8 steps with unilateral rail Mod I    Curb Step:   ascended and descended 4 inch step with HHA and Min A 4\" step no AD sup (x2 reps) 4 inch step with AAD and SBA 4 inch step with AAD and Mod I    Picking up object off the floor Picked up cone with Min assist Picked up shoe sup Will  shoe with stand by assist Will  shoe with no assist   BLE ROM AROM WFL NT     BLE Strength R LE:  Hip flexion 4/5  Knee flexion 4/5  Knee flexion 4/5  Ankle DF 4+/5  Ankle PF 4+/5  L LE:   Hip flexion 4+/5  Knee flexion 4+/5  Knee flexion 4+/5  Ankle DF 5/5  Ankle PF 5/5 NT     Balance  Static standing CGA  Dynamic standing Min <> Mod A with HHA     BBS: TBD  FGA: NT Static standing CGA  Dynamic standing CGA with HHA     BBS: 46/56  (06/10/24)        BBS: >45/56  FGA: TBD   Date Family Teach Completed None to date  Daughter attended 6/6     Is additional Family Teaching Needed?  Y or N Y  Y      Hindering Progress Mild R hemiparesis, Postural instability, reduced activity tolerance  Mild R hemiparesis, Postural instability, reduced activity tolerance      PT recommended ELOS 10-14 days  10-14 days     Team's Discharge Plan       Therapist at Team Meeting         Additional comments: Pt has made progress towards and/or met most functional mobility goals during stay. Progress was initially limited by poor safety awareness, R sided inattention and R sided weakness which improved throughout stay. Pt awarded green dot for in room mobility but requires supervision for high level activities such as 
Goal          7--Independent                Social Interaction              Current Status             4--Minimal Assistance               Short Term Goal         5--Supervision               Long Term Goal          6--Modified Independent                Scheduled Meds:   metoprolol tartrate  25 mg Oral BID    diclofenac sodium  2 g Topical BID    famotidine  20 mg Oral Daily    enoxaparin  40 mg SubCUTAneous Daily    atorvastatin  80 mg Oral Nightly    clopidogrel  75 mg Oral Daily    nicotine  1 patch TransDERmal Daily    amLODIPine  10 mg Oral Daily    hydroCHLOROthiazide  25 mg Oral Daily    gabapentin  100 mg Oral TID     Continuous Infusions:   sodium chloride       PRN Meds:sodium chloride flush, sodium chloride, ondansetron **OR** ondansetron, labetalol, hydrALAZINE, melatonin, calcium carbonate, magnesium hydroxide, ibuprofen, acetaminophen, polyethylene glycol  I/O last 3 completed shifts:  In: 240 [P.O.:240]  Out: -   No intake/output data recorded.    Labs reviewed  CBC: No results for input(s): \"WBC\", \"HGB\", \"PLT\" in the last 72 hours.  BMP:  No results for input(s): \"NA\", \"K\", \"CL\", \"CO2\", \"BUN\", \"CREATININE\", \"GLUCOSE\" in the last 72 hours.  Hepatic: No results for input(s): \"AST\", \"ALT\", \"BILITOT\", \"ALKPHOS\" in the last 72 hours.    Invalid input(s): \"ALB\"  BNP: No results for input(s): \"BNP\" in the last 72 hours.  Lipids: No results for input(s): \"CHOL\", \"HDL\" in the last 72 hours.    Invalid input(s): \"LDLCALCU\"  INR: No results for input(s): \"INR\" in the last 72 hours.    Assessment/  Patient Active Problem List:     HTN (hypertension)     Acute ischemic stroke (HCC)     Acute right-sided weakness     Acute cerebrovascular accident (CVA) (HCC)      Plan/  Continue the rehab program  Continue Lovenox for DVT prophylaxis  Continue Plavix for secondary stroke prevention  Continue atorvastatin for secondary stroke prevention  Continue close neurologic follow-up  Continue nicotine patch  If blood 
results for input(s): \"PROTIME\", \"INR\" in the last 72 hours.  APTT:No results for input(s): \"APTT\" in the last 72 hours.  LIVER PROFILE:No results for input(s): \"AST\", \"ALT\", \"BILIDIR\", \"BILITOT\", \"ALKPHOS\" in the last 72 hours.    Imaging Last 24 Hours:  No results found.  Assessment//Plan           Hospital Problems             Last Modified POA    * (Principal) Acute cerebrovascular accident (CVA) (HCC) 5/31/2024 Yes     Assessment:    Condition: In stable condition.  Improving.   (CVA).     Plan:   Encourage ambulation.   (Showing good progress in therapy  Still has impairments in balance and coordination  She has limited support so we want to get her as close to independent as possible  Blood pressure is running a little high  I have her on twice the dose of Norvasc she was at home as well as the hydrochlorothiazide  I will see how she does with her blood pressure today  If it continues to run high I may add a low-dose of a beta-blocker).       Electronically signed by Ariel Tao MD on 6/6/24 at 8:46 AM EDT    
  Ambulation w/ 7.5# wt RLE 2x100 feet SBA  Stepping over SBQC x3 w/o AD (alternating leading RLE and LLE, SBA x8 reps)      Patient education  Pt educated on role and benefits of acute rehab, stroke recovery     Patient response to education:   Pt verbalized understanding Pt demonstrated skill Pt requires further education in this area   Yes  Yes  Yes         Additional comments: Pt completed functional mobility as noted above. Sessions focused on postural stability and RLE advancement.Pt continues to demonstrate no heel strike on R LE during gait. Poor insight into deficits and decreased safety awareness with mobility.  Continue to progress pt as tolerated.    PM Pt's daughter present for pm session for education and training.  Pt completed functional mobility as noted above.  Much time spent on educating pt and daughter on pt's deficits and safety concerns with mobility.  Discussed pt's lack of insight into deficits. Continue to progress pt as tolerated.    AM:  Time in: 0915  Time out: 1000    PM:  Time in: 1345  Time out: 1430      Continue with established plan of care     Beth Lombardo [id number]      
ROM AROM WFL NT         BLE Strength R LE:  Hip flexion 4/5  Knee flexion 4/5  Knee flexion 4/5  Ankle DF 4+/5  Ankle PF 4+/5  L LE:   Hip flexion 4+/5  Knee flexion 4+/5  Knee flexion 4+/5  Ankle DF 5/5  Ankle PF 5/5 NT         Balance  Static standing CGA  Dynamic standing Min <> Mod A with HHA      BBS: TBD  FGA: NT Static standing CGA  Dynamic standing Min <> Mod A with HHA      BBS: 27/56  (06/02/24)     *outcome may be skewed due to pain         BBS: >45/56  FGA: TBD   Date Family Teach Completed None to date  Daughter attended 6/6            Is additional Family Teaching Needed?  Y or N Y  Y          Hindering Progress Mild R hemiparesis, Postural instability, reduced activity tolerance  Mild R hemiparesis, Postural instability, reduced activity tolerance          PT recommended ELOS 10-14 days  10-14 days         Team's Discharge Plan            Assessment:    Condition: In stable condition.  Improving.   (CVA).     Plan:   Encourage ambulation.   (Patient is making gains in therapy  She still has some balance and safety issues  She is not aware of those deficits or those safety issues  She feels ready to be discharged home and be completely independent  I will address that further with the therapists in team but she is not quite at that point yet).       Electronically signed by Ariel Tao MD on 6/10/24 at 8:33 AM EDT    
overt s/s of pen/asp.  Pt will participate in meal time assessment for 1-2 sessions to provide diet modification and compensatory strategy implementation due to inconsistent episodes of clinical indicators of dysphagia during intake     Long Term Goals:               Pt will maintain adequate nutrition/hydration via PO intake of the least restrictive oral diet with implementation of safe swallow/ compensatory strategies and decrease signs/symptoms of aspiration to less than 1 x/day.       
start of session that she had been experiencing significant vaginal pain since very early this morning, which felt as though it was radiating. Despite this, initial activities were tolerated well, but pain persisted and became distractible. RN was notified of the patient's return of pain with inquiry regarding pain medication. BBS indicates a fall risk at this time, and 10mWT indicates a household level ambulator. Patient continues with gait deficits including decreased propulsion, reduced R stance control and R limb advancement.     She will continue to benefit from gait training in upcoming sessions.     AM:  Time in: 1000  Time out: 1030    PM:  Time in: -  Time out: -      Rehab potential is Good for reaching above PT goals.  Continue with established plan of care       Kimmy Schroeder, PT, DPT  [id number]    
more than 1 overt s/s of pen/asp.  Pt will participate in meal time assessment for 1-2 sessions to provide diet modification and compensatory strategy implementation due to inconsistent episodes of clinical indicators of dysphagia during intake     Long Term Goals:               Pt will maintain adequate nutrition/hydration via PO intake of the least restrictive oral diet with implementation of safe swallow/ compensatory strategies and decrease signs/symptoms of aspiration to less than 1 x/day.       
results for input(s): \"AST\", \"ALT\", \"BILITOT\", \"ALKPHOS\" in the last 72 hours.    Invalid input(s): \"ALB\"  BNP: No results for input(s): \"BNP\" in the last 72 hours.  Lipids: No results for input(s): \"CHOL\", \"HDL\" in the last 72 hours.    Invalid input(s): \"LDLCALCU\"  INR: No results for input(s): \"INR\" in the last 72 hours.    Assessment/  Patient Active Problem List:     HTN (hypertension)     Acute ischemic stroke (HCC)     Acute right-sided weakness     Acute cerebrovascular accident (CVA) (HCC)      Plan/  Continue rehab program  Continue Lovenox for DVT prophylaxis  Will add an ACE inhibitor to her blood pressure regimen  Continue atorvastatin and Plavix for secondary stroke prevention          Jaycob Stafford MD

## 2025-04-11 ENCOUNTER — APPOINTMENT (OUTPATIENT)
Dept: CT IMAGING | Age: 70
End: 2025-04-11
Payer: MEDICAID

## 2025-04-11 ENCOUNTER — HOSPITAL ENCOUNTER (EMERGENCY)
Age: 70
Discharge: LEFT BEFORE DISCHARGE | End: 2025-04-11
Attending: EMERGENCY MEDICINE
Payer: MEDICAID

## 2025-04-11 VITALS
TEMPERATURE: 97.7 F | SYSTOLIC BLOOD PRESSURE: 166 MMHG | DIASTOLIC BLOOD PRESSURE: 100 MMHG | BODY MASS INDEX: 28.98 KG/M2 | HEART RATE: 80 BPM | RESPIRATION RATE: 17 BRPM | WEIGHT: 185 LBS | OXYGEN SATURATION: 100 %

## 2025-04-11 DIAGNOSIS — Z53.21 ELOPED FROM EMERGENCY DEPARTMENT: Primary | ICD-10-CM

## 2025-04-11 LAB
ALBUMIN SERPL-MCNC: 4.4 G/DL (ref 3.5–5.2)
ALP SERPL-CCNC: 99 U/L (ref 35–104)
ALT SERPL-CCNC: 15 U/L (ref 0–32)
ANION GAP SERPL CALCULATED.3IONS-SCNC: 14 MMOL/L (ref 7–16)
AST SERPL-CCNC: 16 U/L (ref 0–31)
BASOPHILS # BLD: 0.03 K/UL (ref 0–0.2)
BASOPHILS NFR BLD: 0 % (ref 0–2)
BILIRUB SERPL-MCNC: 0.5 MG/DL (ref 0–1.2)
BUN SERPL-MCNC: 14 MG/DL (ref 6–23)
CALCIUM SERPL-MCNC: 9.7 MG/DL (ref 8.6–10.2)
CHLORIDE SERPL-SCNC: 104 MMOL/L (ref 98–107)
CK SERPL-CCNC: 75 U/L (ref 20–180)
CO2 SERPL-SCNC: 18 MMOL/L (ref 22–29)
CREAT SERPL-MCNC: 1 MG/DL (ref 0.5–1)
EOSINOPHIL # BLD: 0.09 K/UL (ref 0.05–0.5)
EOSINOPHILS RELATIVE PERCENT: 1 % (ref 0–6)
ERYTHROCYTE [DISTWIDTH] IN BLOOD BY AUTOMATED COUNT: 15.3 % (ref 11.5–15)
GFR, ESTIMATED: 60 ML/MIN/1.73M2
GLUCOSE SERPL-MCNC: 98 MG/DL (ref 74–99)
HCT VFR BLD AUTO: 40.7 % (ref 34–48)
HGB BLD-MCNC: 13 G/DL (ref 11.5–15.5)
IMM GRANULOCYTES # BLD AUTO: 0.04 K/UL (ref 0–0.58)
IMM GRANULOCYTES NFR BLD: 1 % (ref 0–5)
LYMPHOCYTES NFR BLD: 2.09 K/UL (ref 1.5–4)
LYMPHOCYTES RELATIVE PERCENT: 26 % (ref 20–42)
MCH RBC QN AUTO: 28.5 PG (ref 26–35)
MCHC RBC AUTO-ENTMCNC: 31.9 G/DL (ref 32–34.5)
MCV RBC AUTO: 89.3 FL (ref 80–99.9)
MONOCYTES NFR BLD: 0.66 K/UL (ref 0.1–0.95)
MONOCYTES NFR BLD: 8 % (ref 2–12)
NEUTROPHILS NFR BLD: 64 % (ref 43–80)
NEUTS SEG NFR BLD: 5.22 K/UL (ref 1.8–7.3)
PLATELET # BLD AUTO: 264 K/UL (ref 130–450)
PMV BLD AUTO: 10.1 FL (ref 7–12)
POTASSIUM SERPL-SCNC: 3.8 MMOL/L (ref 3.5–5)
PROT SERPL-MCNC: 7.4 G/DL (ref 6.4–8.3)
RBC # BLD AUTO: 4.56 M/UL (ref 3.5–5.5)
SODIUM SERPL-SCNC: 136 MMOL/L (ref 132–146)
WBC OTHER # BLD: 8.1 K/UL (ref 4.5–11.5)

## 2025-04-11 PROCEDURE — 6360000004 HC RX CONTRAST MEDICATION: Performed by: RADIOLOGY

## 2025-04-11 PROCEDURE — 82550 ASSAY OF CK (CPK): CPT

## 2025-04-11 PROCEDURE — 85025 COMPLETE CBC W/AUTO DIFF WBC: CPT

## 2025-04-11 PROCEDURE — 96374 THER/PROPH/DIAG INJ IV PUSH: CPT

## 2025-04-11 PROCEDURE — 99285 EMERGENCY DEPT VISIT HI MDM: CPT

## 2025-04-11 PROCEDURE — 80053 COMPREHEN METABOLIC PANEL: CPT

## 2025-04-11 PROCEDURE — 74177 CT ABD & PELVIS W/CONTRAST: CPT

## 2025-04-11 PROCEDURE — 2500000003 HC RX 250 WO HCPCS: Performed by: RADIOLOGY

## 2025-04-11 RX ORDER — SODIUM CHLORIDE 0.9 % (FLUSH) 0.9 %
10 SYRINGE (ML) INJECTION PRN
Status: DISCONTINUED | OUTPATIENT
Start: 2025-04-11 | End: 2025-04-11 | Stop reason: HOSPADM

## 2025-04-11 RX ORDER — IOPAMIDOL 755 MG/ML
75 INJECTION, SOLUTION INTRAVASCULAR
Status: COMPLETED | OUTPATIENT
Start: 2025-04-11 | End: 2025-04-11

## 2025-04-11 RX ADMIN — IOPAMIDOL 75 ML: 755 INJECTION, SOLUTION INTRAVENOUS at 14:09

## 2025-04-11 RX ADMIN — Medication 10 ML: at 14:12

## 2025-04-11 ASSESSMENT — PAIN DESCRIPTION - ONSET: ONSET: ON-GOING

## 2025-04-11 ASSESSMENT — PAIN DESCRIPTION - PAIN TYPE: TYPE: ACUTE PAIN

## 2025-04-11 ASSESSMENT — LIFESTYLE VARIABLES: HOW OFTEN DO YOU HAVE A DRINK CONTAINING ALCOHOL: MONTHLY OR LESS

## 2025-04-11 ASSESSMENT — PAIN DESCRIPTION - LOCATION
LOCATION: BUTTOCKS
LOCATION: BUTTOCKS

## 2025-04-11 ASSESSMENT — PAIN DESCRIPTION - DESCRIPTORS: DESCRIPTORS: TENDER

## 2025-04-11 ASSESSMENT — PAIN DESCRIPTION - ORIENTATION: ORIENTATION: RIGHT;LEFT

## 2025-04-11 ASSESSMENT — PAIN - FUNCTIONAL ASSESSMENT: PAIN_FUNCTIONAL_ASSESSMENT: 0-10

## 2025-04-11 ASSESSMENT — PAIN DESCRIPTION - FREQUENCY: FREQUENCY: CONTINUOUS

## 2025-04-11 ASSESSMENT — PAIN SCALES - GENERAL: PAINLEVEL_OUTOF10: 10

## 2025-04-11 NOTE — ED NOTES
Name: Rivka Gamboa  : 1955  MRN: [id number]    Date: 2025    Benefits of immediately proceeding with Radiology exam outweigh the risks and therefore the following is being waived:      [] Pregnancy test    [] Protocol for Iodine allergy    [] MRI questionnaire    [x] BUN/Creatinine        MD Mala Flores Shilp, MD  [phone]     Statement Selected

## 2025-04-11 NOTE — ED PROVIDER NOTES
ED PROVIDER NOTE    Chief Complaint   Patient presents with    Buttocks Pain     Both cheeks, none radiating, no wound, no trauma no back pain, pain on palpation,     Dysuria     The added, burning on urination 1 week, denies fever and chills       HPI:  4/11/25,   Time: 1:56 PM EDT       Rivka Gamboa is a 70 y.o. female presenting to the ED for bilateral buttock pain.  Ongoing for the last 2 weeks, initially intermittent, now constant for the last week.  Has been taking ibuprofen without any improvement.  Pain is nonradiating.  Worse with movement.  Denies associated fever, chills, cough, chest pain, shortness of breath, abdominal pain, nausea, vomiting, diarrhea.  Last bowel movement was earlier today.  No rectal pain.  No black or bloody stools.  She does report some dysuria. No recent falls or injuries.    Chart review: hx of HTN, CVA    Reviewed physical medicine and rehab discharge summary from 6/12/2024 by Dr. Tao:  Patient was admitted to [facility] on 5/27/2024 for sudden onset of right hemiparesis.  She was found to have a left internal capsule infarct.    Review of Systems:     Review of Systems  Pertinent positives and negatives as stated in HPI     --------------------------------------------- PAST HISTORY ---------------------------------------------  Past Medical History:   Past Medical History:   Diagnosis Date    Hypertension     Pneumonia     2014, treated outpt        Past Surgical History:   Past Surgical History:   Procedure Laterality Date    COLONOSCOPY  3/25/14    HYSTERECTOMY (CERVIX STATUS UNKNOWN)  2003    [facility]     TONSILLECTOMY         Social History:   Social History     Socioeconomic History    Marital status: Single   Tobacco Use    Smoking status: Some Days     Current packs/day: 1.00     Average packs/day: 1 pack/day for 20.0 years (20.0 ttl pk-yrs)     Types: Cigarettes    Smokeless tobacco: Never    Tobacco comments:     Patient counseled to quit smoking    Substance

## 2025-04-11 NOTE — ED TRIAGE NOTES
Department of Emergency Medicine  FIRST PROVIDER TRIAGE NOTE             Independent MLP           4/11/25  12:42 PM EDT    Date of Encounter: 4/11/25   MRN: [id number]      HPI: Rivka Gamboa is a 70 y.o. female who presents to the ED for Buttocks Pain (Both cheeks, no wound, no trauma no back pain, pain on palpation, ) and Dysuria (The added, burning on urination 1 week)       ROS: Negative for cp, abd pain, Suicidal ideation, or Homicidal Ideation.    PE: Gen Appearance/Constitutional: alert  CV: regular rate  GI: soft and NT     Initial Plan of Care: All treatment areas with department are currently occupied. Plan to order/Initiate the following while awaiting opening in ED: Urinalysis with microscopic and urine culture.  Initiate Treatment-Testing, Proceed toTreatment Area When Bed Available for ED Attending/MLP to Continue Care    The provider-patient relationship was only established for Provider In Triage (PIT) note.  A full assessment, HPI, and examination was not performed and therefore it is not yet possible to state whether or not an emergency medical condition exists.  This is not a comprehensive evaluation.  The provider-patient relationship was terminated after triage completion.    Electronically signed by MARLY Snow CNP   DD: 4/11/25

## 2025-07-23 ENCOUNTER — OFFICE VISIT (OUTPATIENT)
Dept: OBGYN | Age: 70
End: 2025-07-23

## 2025-07-23 VITALS
BODY MASS INDEX: 26.92 KG/M2 | HEART RATE: 74 BPM | WEIGHT: 171.9 LBS | DIASTOLIC BLOOD PRESSURE: 63 MMHG | SYSTOLIC BLOOD PRESSURE: 141 MMHG

## 2025-07-23 DIAGNOSIS — Z01.419 WELL WOMAN EXAM WITH ROUTINE GYNECOLOGICAL EXAM: Primary | ICD-10-CM

## 2025-07-23 RX ORDER — LISINOPRIL 10 MG/1
TABLET ORAL
COMMUNITY
Start: 2025-07-14

## 2025-07-23 RX ORDER — BUPROPION HYDROCHLORIDE 150 MG/1
TABLET ORAL
COMMUNITY
Start: 2025-07-14

## 2025-07-23 SDOH — ECONOMIC STABILITY: FOOD INSECURITY: WITHIN THE PAST 12 MONTHS, THE FOOD YOU BOUGHT JUST DIDN'T LAST AND YOU DIDN'T HAVE MONEY TO GET MORE.: NEVER TRUE

## 2025-07-23 SDOH — ECONOMIC STABILITY: FOOD INSECURITY: WITHIN THE PAST 12 MONTHS, YOU WORRIED THAT YOUR FOOD WOULD RUN OUT BEFORE YOU GOT MONEY TO BUY MORE.: NEVER TRUE

## 2025-07-23 ASSESSMENT — PATIENT HEALTH QUESTIONNAIRE - PHQ9
SUM OF ALL RESPONSES TO PHQ QUESTIONS 1-9: 0
SUM OF ALL RESPONSES TO PHQ QUESTIONS 1-9: 0
1. LITTLE INTEREST OR PLEASURE IN DOING THINGS: NOT AT ALL
SUM OF ALL RESPONSES TO PHQ QUESTIONS 1-9: 0
SUM OF ALL RESPONSES TO PHQ QUESTIONS 1-9: 0
2. FEELING DOWN, DEPRESSED OR HOPELESS: NOT AT ALL

## 2025-07-23 NOTE — PROGRESS NOTES
New Patient (Here to establish care, No cervical cancer screening on file, Date of last Mammogram: 5/28/2013, says she is here for her annual exam. Patient says she no longer does mammograms/)

## 2025-07-23 NOTE — PROGRESS NOTES
HISTORY OF PRESENT ILLNESS:    70 y.o. female   presents for her annual exam.     No LMP recorded. Patient has had a hysterectomy.  Periods are: na  Contraception: na  Most recent pap smear: 10+ yrs ago    Most recent mammogram: 4 yrs ago, she then refuses to continue  History of abnormal mammogram: no  Changes to health since last visit: no  Complaints: no      Past Medical History:   Past Medical History:   Diagnosis Date    Hypertension     Pneumonia     , treated outpt                                              OB History    Para Term  AB Living   6 6       SAB IAB Ectopic Molar Multiple Live Births              # Outcome Date GA Lbr Karthikeyan/2nd Weight Sex Type Anes PTL Lv   6 Para            5 Para            4 Para            3 Para            2 Para            1 Para                   Past Surgical History:   Past Surgical History:   Procedure Laterality Date    COLONOSCOPY  3/25/14    HYSTERECTOMY (CERVIX STATUS UNKNOWN)      [facility]     TONSILLECTOMY          Allergies: Latex, Aspirin, Nsaids, and Penicillins     Medications:   Current Outpatient Medications   Medication Sig Dispense Refill    buPROPion (WELLBUTRIN XL) 150 MG extended release tablet       lisinopril (PRINIVIL;ZESTRIL) 10 MG tablet       amLODIPine (NORVASC) 10 MG tablet Take 1 tablet by mouth daily 30 tablet 0    atorvastatin (LIPITOR) 80 MG tablet Take 1 tablet by mouth nightly 30 tablet 3    hydroCHLOROthiazide (HYDRODIURIL) 25 MG tablet Take 1 tablet by mouth daily 30 tablet 0    lisinopril (PRINIVIL;ZESTRIL) 5 MG tablet Take 1 tablet by mouth daily 30 tablet 0    metoprolol tartrate (LOPRESSOR) 25 MG tablet Take 1 tablet by mouth 2 times daily 60 tablet 0    nicotine (NICODERM CQ) 21 MG/24HR Place 1 patch onto the skin daily 30 patch 0    famotidine (PEPCID) 20 MG tablet Take 1 tablet by mouth daily 60 tablet 0    clopidogrel (PLAVIX) 75 MG tablet Take 1 tablet by mouth daily 30 tablet 0    gabapentin

## 2025-07-23 NOTE — PATIENT INSTRUCTIONS
[employer] Resources*  (Call [employer]/211 if need more resources.)     [facility]  What they offer: Housing for elderly, disabled, handicapped, moderate and low-income families; rental assistance under [address] 8 program (Housing Choice Voucher Program). Call for application information.  [facility] Phone number: [phone]  Website: [employer]  [facility] Phone Number: [phone]  Website: [employer]  [facility] Phone Number: [phone]  Website: [employer]                 [facility]:  What they offer: shelter for women and children as survivors of domestic violence in [facility]  Phone number: [phone]               [employer] [facility]:  What they offer: shelter for women and children as survivors of domestic violence in [facility]  Phone Number: [phone]  Website: [email or web address]    [facility]:  What they offer: shelter for women and children as survivors of domestic violence in [facility]  Phone Number: [phone]  Website: [employer]           HOMELESS [facility]GRAM [facility]  What they offer: Assists homeless persons or families that lack a fixed or regular nighttime residence; shelter houses homeless from 3-30 days. Extended stay optional depending upon housing situation  PHONE Number: [phone], [phone]  Website: [email or web address]    [facility]: [address] [place]  [address]  What they offer: Temporary shelter for homeless persons or families  Phone: [facility]  [phone]       Men   [phone]  Website: [facility]  [facility]: [address] [address]  Phone Number: [phone]  [facility] [place]:  What they offer: Temporary shelter with hot meals for homeless persons 18 and older and for families.  Phone: [phone]  Website: [employer]  [facility] (women): 4692